# Patient Record
Sex: FEMALE | Race: WHITE | NOT HISPANIC OR LATINO | Employment: FULL TIME | URBAN - METROPOLITAN AREA
[De-identification: names, ages, dates, MRNs, and addresses within clinical notes are randomized per-mention and may not be internally consistent; named-entity substitution may affect disease eponyms.]

---

## 2020-04-14 PROBLEM — F41.9 ANXIETY: Status: ACTIVE | Noted: 2020-04-14

## 2020-04-14 RX ORDER — BUPROPION HYDROCHLORIDE 150 MG/1
150 TABLET ORAL DAILY
COMMUNITY
End: 2020-04-21 | Stop reason: SINTOL

## 2020-04-14 RX ORDER — ESCITALOPRAM OXALATE 10 MG/1
10 TABLET ORAL DAILY
COMMUNITY
End: 2020-04-14 | Stop reason: SINTOL

## 2020-04-14 RX ORDER — HYDROXYZINE PAMOATE 50 MG/1
50 CAPSULE ORAL 4 TIMES DAILY
COMMUNITY
End: 2020-12-23

## 2020-04-21 ENCOUNTER — TELEPHONE (OUTPATIENT)
Dept: OBSTETRICS AND GYNECOLOGY | Facility: CLINIC | Age: 25
End: 2020-04-21

## 2020-04-21 DIAGNOSIS — F41.9 ANXIETY: Primary | ICD-10-CM

## 2020-04-21 DIAGNOSIS — Z30.9 ENCOUNTER FOR CONTRACEPTIVE MANAGEMENT, UNSPECIFIED TYPE: ICD-10-CM

## 2020-04-21 RX ORDER — BUSPIRONE HYDROCHLORIDE 7.5 MG/1
7.5 TABLET ORAL 3 TIMES DAILY
Qty: 90 TABLET | Refills: 2 | Status: SHIPPED | OUTPATIENT
Start: 2020-04-21 | End: 2020-08-17 | Stop reason: SDUPTHER

## 2020-04-21 RX ORDER — LEVONORGESTREL AND ETHINYL ESTRADIOL 0.1-0.02MG
1 KIT ORAL DAILY
COMMUNITY
End: 2020-04-21 | Stop reason: SDUPTHER

## 2020-04-21 RX ORDER — LEVONORGESTREL AND ETHINYL ESTRADIOL 0.1-0.02MG
1 KIT ORAL DAILY
Qty: 28 TABLET | Refills: 1 | Status: SHIPPED | OUTPATIENT
Start: 2020-04-21 | End: 2020-08-17 | Stop reason: SDUPTHER

## 2020-04-21 NOTE — TELEPHONE ENCOUNTER
Subjective:      Ana Padilla is a 25 y.o. female who presents for follow up of anxiety disorder. Current symptoms: irritable. She denies current suicidal and homicidal ideation. She complains of the following side effects from the treatment: mood swings, mostly inability to control feelings of irritability that come on suddenly.  Objective:   Unable to perform. Patient evaluated via telephone.     Assessment:   Anxiety Disorder - improving but intolerance of medication therapy     Plan:   Treatment per orders.  Reviewed concept of anxiety as biochemical imbalance of neurotransmitters and rationale for treatment.   Discussed weaning off of Wellbutrin and beginning BuSpar. Patient agrees with plan. Patient verbalizes understanding and denies additional questions at this time.   Patient education provided regarding non-pharmacological methods for dealing with symptoms of anxiety such as, relaxation techniques, deep breathing exercises, exercise, etc. Discussion of pharmacological management was carried out at length, including the following topics:   Medication regimen, side effects, mechanism of action   When first starting medication or making dose adjustments, it may take 2-4 weeks to begin feeling the effects of medication   Avoid abrupt withdrawal from treatment. If you wish to discontinue medication therapy, please contact the office before doing so to discuss how to safely get off the medication.   Dangers associated with sudden discontinuation, including serotonin syndrome   Instructed to report immediately to the nearest ER with any thoughts of suicidal or homicidal ideation. Please notify provider immediately.    Patient made aware of resources available for dealing with suicidal thoughts, including the national suicide hotline number = 1-977-970-TALK (7620) and the website = www.suicidepreventionlifeline.org    Contact provider immediately if symptoms worsen or new symptoms appear.   Report to the  nearest emergency room if symptoms of serotonin syndrome are experienced such as sudden onset of high fever, muscular rigidity, mental status changes, hyperreflexia/clonus, and uncontrolled shivering

## 2020-04-29 ENCOUNTER — TELEPHONE (OUTPATIENT)
Dept: OBSTETRICS AND GYNECOLOGY | Facility: CLINIC | Age: 25
End: 2020-04-29

## 2020-04-29 NOTE — TELEPHONE ENCOUNTER
Patient states she is feeling OK and feels as if the new anxiety medication is working. Advised her to call with any concerns or side effect symptoms immediately to inform us. States understanding.

## 2020-08-16 NOTE — PROGRESS NOTES
"  Subjective:      Patient ID: Ana Padilla is a 25 y.o. female who presents for evaluation today.    Chief Complaint:    Well Woman      History of Present Illness  HPI  Annual Exam-Premenopausal  Patient presents for annual exam. The patient has no complaints today. The patient is sexually active. GYN screening history: last pap: approximate date 06/19/2017 and was normal. The patient wears seatbelts: yes. The patient participates in regular exercise: yes. Has the patient ever been transfused or tattooed?: no. The patient reports that there is not domestic violence in her life.    Contraception Counseling  Patient presents for contraception counseling. The patient has no complaints today. The patient is sexually active. Pertinent past medical history: none.      GYN History  Patient's last menstrual period was 08/10/2020.   Date of Last Pap: Pap smear completed today with screening for gonorrhea, chlamydia and trichomoniasis per CDC guidelines Pap smear schedule reviewed with patient Result history reviewed with patient    VITALS  BP Readings from Last 1 Encounters:   08/17/20 102/70   Weight: 52.6 kg (116 lb)   Height: 5' 4" (162.6 cm)   BMI Readings from Last 1 Encounters:   08/17/20 19.91 kg/m²       FAMILY HISTORY  Family History   Problem Relation Age of Onset    Breast cancer Maternal Grandmother        SOCIAL HISTORY  Social History     Tobacco Use   Smoking Status Never Smoker   ,   Social History     Substance and Sexual Activity   Alcohol Use Yes    Comment: occasional        MEDICATIONS  Outpatient Medications Marked as Taking for the 8/17/20 encounter (Office Visit) with Meenu Sandra NP   Medication Sig Dispense Refill    busPIRone (BUSPAR) 7.5 MG tablet Take 1 tablet (7.5 mg total) by mouth 3 (three) times daily. Take one 7.5 mg tablet by mouth twice daily for two weeks, then increase frequency to three times daily if symptoms are not relieved. 90 tablet 3    [DISCONTINUED] busPIRone (BUSPAR) " 7.5 MG tablet Take 1 tablet (7.5 mg total) by mouth 3 (three) times daily. Take one 7.5 mg tablet by mouth twice daily for two weeks, then increase frequency to three times daily if symptoms are not relieved. 90 tablet 2     Review of Systems  Review of Systems   Constitutional: Negative for activity change, appetite change, chills, diaphoresis, fatigue, fever and unexpected weight change.   HENT: Negative for mouth sores.    Eyes: Negative for visual disturbance.   Respiratory: Negative for cough and shortness of breath.    Cardiovascular: Negative for chest pain, palpitations and leg swelling.   Gastrointestinal: Negative for abdominal pain, bloating, blood in stool, constipation, diarrhea and nausea.   Endocrine: Negative for hair loss and hot flashes.   Genitourinary: Negative for dysmenorrhea, dysuria, menstrual problem, vaginal discharge, vaginal pain, urinary incontinence, vaginal dryness and vaginal odor.   Musculoskeletal: Negative for back pain and leg pain.   Integumentary:  Negative for rash, acne, hair changes, mole/lesion, breast mass, nipple discharge and breast skin changes.   Neurological: Negative for headaches.   Hematological: Negative for adenopathy. Does not bruise/bleed easily.   Psychiatric/Behavioral: Negative for depression and sleep disturbance. The patient is nervous/anxious (improving).    Breast: Positive for breast self exam.Negative for asymmetry, mass, mastodynia, nipple discharge and skin changes       Objective:     Physical Exam:   Constitutional: She is oriented to person, place, and time. She appears well-developed and well-nourished. She is cooperative. She does not appear ill. No distress.    HENT:   Head: Normocephalic and atraumatic.   Right Ear: External ear normal.   Left Ear: External ear normal.     Neck: Trachea normal and normal range of motion. Neck supple. No thyroid mass and no thyromegaly present.    Cardiovascular: Normal rate, regular rhythm and normal pulses.      Pulmonary/Chest: Effort normal and breath sounds normal. No stridor. No respiratory distress. Chest wall is not dull to percussion. She exhibits no mass, no bony tenderness, no laceration, no crepitus, no edema, no deformity, no swelling and no retraction. Right breast exhibits no inverted nipple, no mass, no nipple discharge, no skin change, no tenderness, presence, no bleeding and no swelling. Left breast exhibits no inverted nipple, no mass, no nipple discharge, no skin change, no tenderness, presence, no bleeding and no swelling. Breasts are symmetrical.        Abdominal: Soft. Normal appearance and bowel sounds are normal. She exhibits no distension. There is no abdominal tenderness. No hernia.     Genitourinary:    Vagina, uterus and rectum normal.      Pelvic exam was performed with patient supine.   There is no rash, tenderness, lesion or injury on the right labia. There is no rash, tenderness, lesion or injury on the left labia. Cervix is normal. Right adnexum displays no mass, no tenderness and no fullness. Left adnexum displays no mass, no tenderness and no fullness. No tenderness or bleeding in the vagina.    No foreign body in the vagina.   Labial bartholins normal.Additional cervical findings: pap smear donenegative for vaginal discharge          Musculoskeletal: Normal range of motion and moves all extremeties.       Neurological: She is alert and oriented to person, place, and time.    Skin: Skin is warm and dry. No rash noted. She is not diaphoretic. No erythema. No pallor.    Psychiatric: She has a normal mood and affect. Her speech is normal and behavior is normal. Judgment and thought content normal.          Assessment:      1. Well woman exam with routine gynecological exam    2. Encounter for Papanicolaou smear for cervical cancer screening    3. Encounter for surveillance of contraceptive pills    4. Encounter for contraceptive management, unspecified type    5. Anxiety      Plan:   Pap  smear obtained today, will follow up with patient when results are received. The patient verbalizes desire to continue with current contraceptive regimen. The risks associated with oral contraceptive use, with special emphasis placed on the risk for stroke, blood clots, MI and death were reviewed with the patient. The patient was provided with the opportunity to ask questions and verbalize concerns. Patient education was provided on the following aspects associated with oral contraceptive use:   Take the pill at the same time every day, set an alarm to serve as a reminder if necessary    If nausea or vomiting occurs when taking the medication, take with food or at bedtime   Missing doses may lead to breakthrough bleeding    Your periods may become lighter and/or shorter in duration than usual   If a pill is missed for one day, take 2 pills the next day   If two consecutive days are missed, take 2 pills for the next 2 days to catch up and finish the birth control pack. Utilize a backup method of contraception for the remainder of the pill pack.   Certain medications, especially antibiotics, may decrease the effectiveness of the pill   Oral contraceptives do not provide protection against sexually transmitted diseases  If any of the following symptoms are experiencing report to the nearest emergency room for further evaluation and discontinue the pill immediately.   Severe abdominal pain   Severe chest pain   Severe headache or paresthesia    Changes in vision   Severe leg pain or swelling   Shortness of breath and increased heart rate   Discussed by taking the pill, the patient consents to knowing and understanding the risks associated with oral contraceptive therapy. The patient verbalizes understanding of the components discussed and denies additional questions at this time.  Patient reports control of anxiety symptoms on current medication regimen. Patient education provided regarding  non-pharmacological methods for dealing with symptoms of anxiety such as, relaxation techniques, deep breathing exercises, exercise, etc. Discussion of pharmacological management was carried out at length, including the following topics:   Medication regimen, side effects, mechanism of action   When first starting medication or making dose adjustments, it may take 2-4 weeks to begin feeling the effects of medication   Avoid abrupt withdrawal from treatment. If you wish to discontinue medication therapy, please contact the office before doing so to discuss how to safely get off the medication.   Dangers associated with sudden discontinuation, including serotonin syndrome   Instructed to report immediately to the nearest ER with any thoughts of suicidal or homicidal ideation. Please notify provider immediately.    Patient made aware of resources available for dealing with suicidal thoughts, including the national suicide hotline number = 3-040-536-TALK (0830) and the website = www.suicidepreventionlifeline.org    Contact provider immediately if symptoms worsen or new symptoms appear.   Report to the nearest emergency room if symptoms of serotonin syndrome are experienced such as sudden onset of high fever, muscular rigidity, mental status changes, hyperreflexia/clonus, and uncontrolled shivering    Patient was counseled today on current [facility]P pap smear guidelines, the recommendation for yearly pelvic and clinical breast exams, healthy diet consumption, implementing exercise routines 4-5x/week, when to begin mammogram screenings, and breast self awareness. Patient instructed to contact the clinic should any questions or concerns arise prior to her next office visit. Patient is happy with the plan of care at this time, verbalizes understanding and denies outstanding questions.

## 2020-08-17 ENCOUNTER — OFFICE VISIT (OUTPATIENT)
Dept: OBSTETRICS AND GYNECOLOGY | Facility: CLINIC | Age: 25
End: 2020-08-17
Payer: COMMERCIAL

## 2020-08-17 VITALS
HEIGHT: 64 IN | DIASTOLIC BLOOD PRESSURE: 70 MMHG | SYSTOLIC BLOOD PRESSURE: 102 MMHG | WEIGHT: 116 LBS | BODY MASS INDEX: 19.81 KG/M2

## 2020-08-17 DIAGNOSIS — F41.9 ANXIETY: ICD-10-CM

## 2020-08-17 DIAGNOSIS — Z30.9 ENCOUNTER FOR CONTRACEPTIVE MANAGEMENT, UNSPECIFIED TYPE: ICD-10-CM

## 2020-08-17 DIAGNOSIS — Z01.419 WELL WOMAN EXAM WITH ROUTINE GYNECOLOGICAL EXAM: Primary | ICD-10-CM

## 2020-08-17 DIAGNOSIS — Z12.4 ENCOUNTER FOR PAPANICOLAOU SMEAR FOR CERVICAL CANCER SCREENING: ICD-10-CM

## 2020-08-17 PROCEDURE — 99395 PR PREVENTIVE VISIT,EST,18-39: ICD-10-PCS | Mod: S$GLB,,, | Performed by: OBSTETRICS & GYNECOLOGY

## 2020-08-17 PROCEDURE — 99395 PREV VISIT EST AGE 18-39: CPT | Mod: S$GLB,,, | Performed by: OBSTETRICS & GYNECOLOGY

## 2020-08-17 RX ORDER — LEVONORGESTREL AND ETHINYL ESTRADIOL 0.1-0.02MG
1 KIT ORAL DAILY
Qty: 90 TABLET | Refills: 3 | Status: SHIPPED | OUTPATIENT
Start: 2020-08-17 | End: 2021-04-20 | Stop reason: SDUPTHER

## 2020-08-17 RX ORDER — BUSPIRONE HYDROCHLORIDE 7.5 MG/1
7.5 TABLET ORAL 3 TIMES DAILY
Qty: 90 TABLET | Refills: 3 | Status: SHIPPED | OUTPATIENT
Start: 2020-08-17 | End: 2020-12-23 | Stop reason: SDUPTHER

## 2020-08-17 NOTE — PATIENT INSTRUCTIONS
Breast Health: Breast Self-Awareness  What is breast self-awareness?  Breast self-awareness is knowing how your breasts normally look and feel. Your breasts change as you go through different stages of your life. So its important to learn what is normal for your breasts. Breast self-awareness helps you notice any changes in your breasts right away. Report any changes to your healthcare provider.  Why is breast self-awareness important?  Many experts now say that women should focus on breast self-awareness instead of doing a breast self-examination (BSE). These experts include the American Cancer Society, the U.S. Preventive Services Task Force, and the American Congress of Obstetricians and Gynecologists. Some experts even advise not teaching women to do a BSE. Thats because research hasnt shown a clear benefit to doing BSEs.  Breast self-awareness is different than a BSE. Breast self-awareness isnt about following a certain method and schedule. Its about knowing what's normal for your breasts. That way you can notice even small changes right away. If you see any changes, report them to your healthcare provider.  Changes to look for  Call your healthcare provider if you find any changes in your breasts that concern you. These changes may include:  · A lump  · Nipple discharge other than breast milk, especially a bloody discharge  · Swelling  · A change in size or shape  · Skin irritation, such as redness, thickening, or dimpling of the skin  · Swollen lymph nodes in the armpit  · Nipple problems, such as pain or redness  If you find a lump  Contact your provider if you find lumpiness in one breast, feel something different in the tissue, or feel a definite lump. Sometimes lumpiness may be due to menstrual changes. But there may be reason for concern.  Your provider may want to see you right away if you have:  · Nipple discharge that is bloody  · Skin changes on your breast, such as dimpling or puckering  Its  normal to be upset if you find a lump. But its important to contact your provider right away. Remember that most breast lumps are benign. This means they are not cancer.  Date Last Reviewed: 8/10/2015  © 2078-0967 [employer]. [address] [place] [id number]. All rights reserved. This information is not intended as a substitute for professional medical care. Always follow your healthcare professional's instructions.          Birth Control Choices  Birth control keeps you from getting pregnant during sex. There are many types of birth control. Some are more effective than others. New types are being tested all the time. Your healthcare provider can help you decide which type of birth control is best for you. But no matter which type you choose, you and your partner must use it the right way each time you have sex. Some of the most common types are described below.  Condom  A condom is a thin covering that fits over the penis. (The female condom fits inside the vagina.) A condom catches sperm that come out of the penis during sex.  Spermicide  Spermicide is a gel, foam, cream, tablet, or sponge (although the sponge has barrier properties in addition to spermicidal properties). It is put in the vagina before sex to kill sperm.  Diaphragm and cervical cap  Diaphragms and cervical caps are round rubber cups that keep sperm out of the uterus. They also hold spermicide in place.  Intrauterine device (IUD)  An IUD is a small device that is placed in the uterus by a healthcare provider to prevent pregnancy.  The pill  The birth control pill is taken daily. It contains hormones that stop a womans body from releasing an egg each month.  Other hormones  Hormones that stop a womans egg from being released each month can be delivered in other ways. These include injection, implant, patch, or vaginal ring.  Other choices  Here are additional birth control methods:  · Male sterilization (vasectomy) is  surgery that ties off or cuts the tubes called the vas deferens in the testes. This is done so sperm cannot come out when the man ejaculates.  · Female sterilization is surgery to block or cut the woman's fallopian tubes. It can be done by placing an instrument into the uterus (hysteroscopy) to insert small coils into the fallopian tubes (Essure). It can also be done through the belly (laparoscopy) to block the tubes or remove part or all of the tubes.  · Withdrawal method is when the male doesn't ejaculate into the vagina, but rather withdraws his penis just before he ejaculates.  · Fertility awareness method is when a woman keeps track of her fertile days. She only has intercourse at times when she is not likely to get pregnant.  Emergency contraception (EC)  Emergency contraception can help prevent pregnancy after unprotected sex. Hormone pills (morning after pills) are available over the counter to anyone. A second type of EC, a copper IUD, needs to be inserted by a trained healthcare provider. Either type of EC can be used up to 5 days after sex, but it should be taken as soon as possible. The sooner it is used after unprotected sex, the more likely it is to be effective. EC will not work if youre already pregnant.  Things to consider  Think about the following:  · Choose a type of birth control that is easy for you to use.  · Read the package and follow your health care provider's instructions to learn to use your birth control the right way.  · Most forms of birth control do not protect you from sexually transmitted infections (STIs). To protect against STIs, always use a latex condom. If you are allergic to latex, a nonlatex condom may provide some protection.   Date Last Reviewed: 12/1/2016  © 7590-9376 [employer]. [address] [place] [address], [place] [address]. All rights reserved. This information is not intended as a substitute for professional medical care. Always follow your healthcare  professional's instructions.          Birth Control: The Pill    Birth control pills contain hormones that help prevent pregnancy. The pills are prescribed by your healthcare provider. There are many types of birth control pills available. If you have side effects from one type of pill, tell your healthcare provider. He or she may be able to prescribe a pill that works better for you.  Pregnancy rates  Talk to your healthcare provider about the effectiveness of this birth control method.  Using the pill  · Take one pill daily. Take it at around the same time each day.  · Follow your healthcare providers guidelines on when to start your first pack of pills. You may need to use another form of birth control for a week or more after you start.  · Know what to do if you forget to take a pill. (Consult your healthcare provider or check the package.) If you miss more than one pill, you may need to use a backup method of birth control for a week or more.  Pros  · Low pregnancy rate  · No interruption to sex  · Easy to use  · Can help make periods more regular  · May lower your risk of ovarian cysts and certain cancers  · May decrease menstrual cramps, menstrual flow, and acne  Cons  · Does not protect against sexually transmitted infection (STIs)  · Requires taking a pill on time each day  · May not work as well when taken with certain other medicines (check with your pharmacist)  · May cause side effects such as nausea, irregular bleeding, headaches, breast tenderness, fatigue, or mood changes (these often go away within 3 months)  · May increase the risk of blood clots, heart attack, and stroke  The pill may not be for you  The pill may not be for you if:  · You are a smoker and over age 35  · You have high blood pressure or gallbladder, liver, cerebrovascular  or heart disease  · You have diabetes, migraines, blood clot in the vein or artery, lupus, depression, certain lipid disorders, or take medicines that interfere  with the pill  In these cases, discuss the risks with your healthcare provider.  Date Last Reviewed: 3/1/2017  © 2773-7716 [employer]. [address] [place] [address], [place] [address]. All rights reserved. This information is not intended as a substitute for professional medical care. Always follow your healthcare professional's instructions.          The Range of Pap Test Results  When your Pap test is sent to the lab, the lab studies your cell samples and reports any abnormal cell changes. Your health care provider can discuss these changes with you. In some cases, an abnormal Pap test is due to an infection. More serious cell changes range from dysplasia to cancer. Talk to your health care provider about your Pap test.    Normal results  Cervical cells, even normal ones, are always changing. As they mature, normal squamous cells move from deeper layers within the cervix. Over time, these cells flatten and cover the surface of the cervix. Within the cervical canal, the cells are different. These glandular cells are taller and not as flat as the cells on the surface of the cervix. When a Pap test sample shows healthy cells of both types, the results are negative. Keep having Pap tests as often as directed.  Abnormal results  A positive Pap test result means some cells in the sample showed abnormal changes. These results are grouped by the type of cell change and the location, or extent, of the changes. Depending on the results, you may need further testing.  · Inflammation: Noncancerous changes are present. They may be due to normal cell repair. Or, they may be caused by an infection, such as HPV or yeast. Further testing may be needed. (Also called reactive cellular changes.)  · Atypical squamous cells: Test results are unclear. Cells on the surface of the cervix show changes, but their significance is not yet known. Testing for HPV and other sexually transmitted infections (STIs) may be needed. Treatment  may be required. (Reported as ASC-US or ASC-H.)  · Atypical glandular cells: Cells lining the cervical canal show abnormal changes. Further testing is likely. You may also have treatment to destroy or remove problem cells. (Reported as AGC.)  · Mild dysplasia: Cells show distinct changes. More testing or HPV typing may be done. You may also have treatment to destroy or remove problem cells. (Reported as low-grade SUNNY or CALEB 1.)  · Moderate to severe dysplasia: Cells show precancerous changes. Or, noninvasive cancer (carcinoma in situ) may be present. Treatment to destroy or remove problem cells is likely. (Reported as high-grade SUNNY or CALEB 2 or CALEB 3.)  · Cancer: Different types of cancer may be detected by your Pap test. More tests to assess the cancer's extent are likely. The type of treatment will depend on the test results and other factors, such as age and health history. (Reported as squamous cell carcinoma, endocervical adenocarcinoma in situ, or adenocarcinoma.)  Date Last Reviewed: 5/12/2015 © 2000-2017 [employer]. [address] [place] [address]. All rights reserved. This information is not intended as a substitute for professional medical care. Always follow your healthcare professional's instructions.          When You Have an Abnormal Pap Test    The Pap test is a screening test that checks for cell changes in the cervix, the opening of the uterus. In some cases, it checks for a virus that can cause cervical cancer. If your Pap results were abnormal, you may be worried. But there is no reason to panic. An abnormal Pap test result can mean many things. It may be due to changes (inflammation) caused by normal cell repair or infection. Or you may have a problem called dysplasia that could become cervical cancer. If so, know that dysplasia tends to progress very slowly before becoming cervical cancer. Thats why its so important to have Pap tests as often as directed. Pap tests  can show cell changes in the cervix early, when treatment is most effective.  Talk to your health care provider  Be sure to discuss your results with your health care provider. Find out about any follow-up tests youll need. You may be asked to come back for a second Pap test in a few months. Or, you may be scheduled for an exam so your health care provider can get a closer look at your cervix. In either case, be sure to keep your follow-up visits. They are one of your best safeguards against future problems.  Understanding your risk  Some lifestyle choices can increase your risk of abnormal cell changes. Did you start having sex at a young age? Have you had many sexual partners? Have you had sex without using a latex condom? Do you smoke? If you answered yes to any of these questions, you are more at risk. One of the most common reasons for an abnormal Pap result is infection with the human papillomavirus (HPV). If your Pap results suggest HPV, further testing may be needed.     The Pap test  During the test:  · An instrument called a speculum is inserted into the vagina to hold it open. This lets your health care provider see the cervix.  · A small brush or swab is used to take cells from several areas of the cervix. The cells are put into a liquid or on a slide. They are then sent to a lab where they are studied for changes. Your health care provider will contact you with the results.   Date Last Reviewed: 4/26/2015  © 3433-6900 [employer]. [address] [place] [address]. All rights reserved. This information is not intended as a substitute for professional medical care. Always follow your healthcare professional's instructions.          Anxiety Reaction  Anxiety is the feeling we all get when we think something bad might happen. It is a normal response to stress and usually causes only a mild reaction. When anxiety becomes more severe, it can interfere with daily life. In some cases,  you may not even be aware of what it is youre anxious about. There may also be a genetic link or it may be a learned behavior in the home.  Both psychological and physical triggers cause stress reaction. It's often a response to fear or emotional stress, real or imagined. This stress may come from home, family, work, or social relationships.  During an anxiety reaction, you may feel:  · Helpless  · Nervous  · Depressed  · Irritable  Your body may show signs of anxiety in many ways. You may experience:  · Dry mouth  · Shakiness  · Dizziness  · Weakness  · Trouble breathing  · Breathing fast (hyperventilating)  · Chest pressure  · Sweating  · Headache  · Nausea  · Diarrhea  · Tiredness  · Inability to sleep  · Sexual problems  Home care  · Try to locate the sources of stress in your life. They may not be obvious. These may include:  ¨ Daily hassles of life (traffic jams, missed appointments, car troubles, etc.)  ¨ Major life changes, both good (new baby, job promotion) and bad (loss of job, loss of loved one)  ¨ Overload: feeling that you have too many responsibilities and can't take care of all of them at once  ¨ Feeling helpless, feeling that your problems are beyond what youre able to solve  · Notice how your body reacts to stress. Learn to listen to your body signals. This will help you take action before the stress becomes severe.  · When you can, do something about the source of your stress. (Avoid hassles, limit the amount of change that happens in your life at one time and take a break when you feel overloaded).  · Unfortunately, many stressful situations can't be avoided. It is necessary to learn how to better manage stress. There are many proven methods that will reduce your anxiety. These include simple things like exercise, good nutrition and adequate rest. Also, there are certain techniques that are helpful:  ¨ Relaxation  ¨ Breathing exercises  ¨ Visualization  ¨ Biofeedback  ¨ Meditation  For more  information about this, consult your doctor or go to a local bookstore and review the many books and tapes available on this subject.  Follow-up care  If you feel that your anxiety is not responding to self-help measures, contact your doctor or make an appointment with a counselor. You may need short-term psychological counseling and temporary medicine to help you manage stress.  Call 911  Call your healthcare provider right away if any of these occur:  · Trouble breathing  · Confusion  · Drowsiness or trouble wakening  · Fainting or loss of consciousness  · Rapid heart rate  · Seizure  · New chest pain that becomes more severe, lasts longer, or spreads into your shoulder, arm, neck, jaw, or back  When to seek medical advice  Call your healthcare provider right away if any of these occur:  · Your symptoms get worse  · Severe headache not relieved by rest and mild pain reliever  Date Last Reviewed: 9/29/2015  © 1105-9564 [employer]. [address] [place] [address]. All rights reserved. This information is not intended as a substitute for professional medical care. Always follow your healthcare professional's instructions.          Treating Anxiety Disorders with Medicine  An anxiety disorder can make you feel nervous or apprehensive, even without a clear reason. In people age 65 and older, generalized anxiety disorder is one of the most commonly diagnosed anxiety disorders. Many times it occurs with depression. Certain anxiety disorders can cause intense feelings of fear or panic. You may even have physical symptoms such as a racing heartbeat, sweating, or dizziness. If you have these feelings, you dont have to suffer anymore. Treatment to help you overcome your fears will likely include therapy (also called counseling). Medicine may also be prescribed to help control your symptoms.    Medicines  Certain medicines may be prescribed to help control your symptoms. So you may feel less anxious.  You may also feel able to move forward with therapy. At first, medicines and dosages may need to be adjusted to find what works best for you. Try to be patient. Tell your healthcare provider how a medicine makes you feel. This way, you can work together to find the treatment thats best for you. Keep in mind that medicines can have side effects. Talk with your provider about any side effects that are bothering you. Changing the dose or type of medicine may help. Dont stop taking medicine on your own. That can cause symptoms to come back.  · Anti-anxiety medicine. This medicine eases symptoms and helps you relax. Your healthcare provider will explain when and how to use it. It may be prescribed for use before situations that make you anxious. You may also be told to take medicine on a regular schedule. Anti-anxiety medicine may make you feel a little sleepy or out of it. Dont drive a car or operate machinery while on this medicine, until you know how it affects you.  Caution  Never use alcohol or other drugs with anti-anxiety medicines. This could result in loss of muscular control, sedation, coma, or death. Also, use only the amount of medicine prescribed for you. If you think you may have taken too much, get emergency care right away.   · Antidepressant medicine. This kind of medicine is often used to treat anxiety, even if you arent depressed. An antidepressant helps balance out brain chemicals. This helps keep anxiety under control. This medicine is taken on a schedule. It takes a few weeks to start working. If you dont notice a change at first, you may just need more time. But if you dont notice results after the first few weeks, tell your provider.  Keep taking medicines as prescribed  Never change your dosage, share or use another person's medicine, or stop taking your medicines without talking to your healthcare provider first. Keep the following in mind:  · Some medicines must be taken on a schedule.  Make this part of your daily routine. For instance, always take your pill before brushing your teeth. A pillbox can help you remember if youve taken your medicine each day.  · Medicines are often taken for 6 to 12 months. Your healthcare provider will then evaluate whether you need to stay on them. Many people who have also had therapy may no longer need medicine to manage anxiety.  · You may need to stop taking medicine slowly to give your body time to adjust. When its time to stop, your healthcare provider will tell you more. Remember: Never stop taking your medicine without talking to your provider first.  · If symptoms return, you may need to start taking medicines again. This isnt your fault. Its just the nature of your anxiety disorder.  Special concerns  · Side effects. Medicines may cause side effects. Ask your healthcare provider or pharmacist what you can expect. They may have ideas for avoiding some side effects.  · Sexual problems. Some antidepressants can affect your desire for sex or your ability to have an orgasm. A change in dosage or medicine often solves the problem. If you have a sexual side effect that concerns you, tell your healthcare provider.  · Addiction. If youve never had a problem with drugs or alcohol, you may not have a problem with medicines used to treat anxiety disorders. But always discuss the medicines with your healthcare provider before taking them. If you have a history of addiction, you may not be able to use certain medicines used to treat anxiety disorders.  · Medicine interactions. Always check with your pharmacist before using any over-the-counter medicines, including herbal supplements.   Date Last Reviewed: 5/1/2017  © 8150-5967 [employer]. [address] [place] [address], [place] [address]. All rights reserved. This information is not intended as a substitute for professional medical care. Always follow your healthcare professional's instructions.

## 2020-08-17 NOTE — PROGRESS NOTES
Yes seatbelt, yes exercise, no transfusion, no tattoo, no domestic    Here for annual today. No issues or concerns today. Need refill on OCP.

## 2020-08-19 LAB
CHLAMYDIA: NEGATIVE
GONORRHEA: NEGATIVE
SOURCE: NORMAL
SOURCE: NORMAL
TRICHOMONAS AMPLIFIED: NEGATIVE

## 2020-12-23 ENCOUNTER — TELEPHONE (OUTPATIENT)
Dept: OBSTETRICS AND GYNECOLOGY | Facility: CLINIC | Age: 25
End: 2020-12-23

## 2020-12-23 DIAGNOSIS — F41.9 ANXIETY: Primary | ICD-10-CM

## 2020-12-23 RX ORDER — CLORAZEPATE DIPOTASSIUM 7.5 MG/1
7.5 TABLET ORAL EVERY 8 HOURS PRN
Qty: 30 TABLET | Refills: 0 | Status: SHIPPED | OUTPATIENT
Start: 2020-12-23 | End: 2020-12-23

## 2020-12-23 RX ORDER — BUSPIRONE HYDROCHLORIDE 15 MG/1
15 TABLET ORAL 3 TIMES DAILY
Qty: 90 TABLET | Refills: 2 | Status: SHIPPED | OUTPATIENT
Start: 2020-12-23 | End: 2020-12-23

## 2020-12-23 RX ORDER — BUSPIRONE HYDROCHLORIDE 15 MG/1
15 TABLET ORAL 3 TIMES DAILY
Qty: 90 TABLET | Refills: 2 | Status: SHIPPED | OUTPATIENT
Start: 2020-12-23 | End: 2021-06-11 | Stop reason: SDUPTHER

## 2020-12-23 RX ORDER — CLORAZEPATE DIPOTASSIUM 7.5 MG/1
7.5 TABLET ORAL EVERY 8 HOURS PRN
Qty: 30 TABLET | Refills: 0 | Status: SHIPPED | OUTPATIENT
Start: 2020-12-23 | End: 2022-08-19 | Stop reason: SDUPTHER

## 2020-12-23 NOTE — TELEPHONE ENCOUNTER
Patient reports increase in anxiety symptoms, no longer controlled by her usual medication regimen. She states the medication has been working up until about 1 week ago when she noticed the increase in symptoms. Discussed increasing the dose of her usual medication regimen and adding an as needed medication. Discussed safety considerations with use of the as needed treatment. Patient verbalizes understanding and denies additional questions at this time. F/u in 4 weeks     No evidence of abuse/diversion/addiction suspected.  checked prior to prescribing with clear report. Patient is aware of side effects to report, risks associated with medication usage, etc.

## 2021-01-14 ENCOUNTER — TELEPHONE (OUTPATIENT)
Dept: OBSTETRICS AND GYNECOLOGY | Facility: CLINIC | Age: 26
End: 2021-01-14

## 2021-04-20 ENCOUNTER — TELEPHONE (OUTPATIENT)
Dept: OBSTETRICS AND GYNECOLOGY | Facility: CLINIC | Age: 26
End: 2021-04-20

## 2021-04-20 DIAGNOSIS — Z30.9 ENCOUNTER FOR CONTRACEPTIVE MANAGEMENT, UNSPECIFIED TYPE: ICD-10-CM

## 2021-04-20 DIAGNOSIS — Z30.41 ENCOUNTER FOR SURVEILLANCE OF CONTRACEPTIVE PILLS: Primary | ICD-10-CM

## 2021-04-20 RX ORDER — LEVONORGESTREL AND ETHINYL ESTRADIOL 0.1-0.02MG
1 KIT ORAL DAILY
Qty: 90 TABLET | Refills: 1 | Status: SHIPPED | OUTPATIENT
Start: 2021-04-20 | End: 2021-08-18 | Stop reason: SDUPTHER

## 2021-04-20 RX ORDER — LEVONORGESTREL AND ETHINYL ESTRADIOL 0.1-0.02MG
1 KIT ORAL DAILY
Qty: 90 TABLET | Refills: 1 | Status: SHIPPED | OUTPATIENT
Start: 2021-04-20 | End: 2021-04-20

## 2021-06-11 ENCOUNTER — TELEPHONE (OUTPATIENT)
Dept: FAMILY MEDICINE | Facility: CLINIC | Age: 26
End: 2021-06-11

## 2021-06-11 DIAGNOSIS — F41.9 ANXIETY: Primary | ICD-10-CM

## 2021-06-11 RX ORDER — BUSPIRONE HYDROCHLORIDE 15 MG/1
15 TABLET ORAL 3 TIMES DAILY
Qty: 90 TABLET | Refills: 0 | Status: SHIPPED | OUTPATIENT
Start: 2021-06-11 | End: 2021-06-11

## 2021-06-11 RX ORDER — BUSPIRONE HYDROCHLORIDE 15 MG/1
15 TABLET ORAL 3 TIMES DAILY
Qty: 90 TABLET | Refills: 3 | Status: SHIPPED | OUTPATIENT
Start: 2021-06-11 | End: 2021-08-18 | Stop reason: SDUPTHER

## 2021-08-18 ENCOUNTER — OFFICE VISIT (OUTPATIENT)
Dept: FAMILY MEDICINE | Facility: CLINIC | Age: 26
End: 2021-08-18
Payer: COMMERCIAL

## 2021-08-18 VITALS
SYSTOLIC BLOOD PRESSURE: 102 MMHG | DIASTOLIC BLOOD PRESSURE: 69 MMHG | BODY MASS INDEX: 20.35 KG/M2 | HEIGHT: 64 IN | RESPIRATION RATE: 14 BRPM | OXYGEN SATURATION: 98 % | HEART RATE: 81 BPM | TEMPERATURE: 97 F | WEIGHT: 119.19 LBS

## 2021-08-18 DIAGNOSIS — Z12.4 ENCOUNTER FOR PAPANICOLAOU SMEAR FOR CERVICAL CANCER SCREENING: ICD-10-CM

## 2021-08-18 DIAGNOSIS — Z01.419 WELL WOMAN EXAM WITH ROUTINE GYNECOLOGICAL EXAM: Primary | ICD-10-CM

## 2021-08-18 DIAGNOSIS — Z30.9 ENCOUNTER FOR CONTRACEPTIVE MANAGEMENT, UNSPECIFIED TYPE: ICD-10-CM

## 2021-08-18 DIAGNOSIS — F41.9 ANXIETY: ICD-10-CM

## 2021-08-18 PROCEDURE — 99395 PR PREVENTIVE VISIT,EST,18-39: ICD-10-PCS | Mod: S$GLB,,, | Performed by: OBSTETRICS & GYNECOLOGY

## 2021-08-18 PROCEDURE — 3074F SYST BP LT 130 MM HG: CPT | Mod: CPTII,S$GLB,, | Performed by: OBSTETRICS & GYNECOLOGY

## 2021-08-18 PROCEDURE — 99395 PREV VISIT EST AGE 18-39: CPT | Mod: S$GLB,,, | Performed by: OBSTETRICS & GYNECOLOGY

## 2021-08-18 PROCEDURE — 1159F PR MEDICATION LIST DOCUMENTED IN MEDICAL RECORD: ICD-10-PCS | Mod: CPTII,S$GLB,, | Performed by: OBSTETRICS & GYNECOLOGY

## 2021-08-18 PROCEDURE — 3008F BODY MASS INDEX DOCD: CPT | Mod: CPTII,S$GLB,, | Performed by: OBSTETRICS & GYNECOLOGY

## 2021-08-18 PROCEDURE — 3008F PR BODY MASS INDEX (BMI) DOCUMENTED: ICD-10-PCS | Mod: CPTII,S$GLB,, | Performed by: OBSTETRICS & GYNECOLOGY

## 2021-08-18 PROCEDURE — 3078F DIAST BP <80 MM HG: CPT | Mod: CPTII,S$GLB,, | Performed by: OBSTETRICS & GYNECOLOGY

## 2021-08-18 PROCEDURE — 3078F PR MOST RECENT DIASTOLIC BLOOD PRESSURE < 80 MM HG: ICD-10-PCS | Mod: CPTII,S$GLB,, | Performed by: OBSTETRICS & GYNECOLOGY

## 2021-08-18 PROCEDURE — 1160F PR REVIEW ALL MEDS BY PRESCRIBER/CLIN PHARMACIST DOCUMENTED: ICD-10-PCS | Mod: CPTII,S$GLB,, | Performed by: OBSTETRICS & GYNECOLOGY

## 2021-08-18 PROCEDURE — 3074F PR MOST RECENT SYSTOLIC BLOOD PRESSURE < 130 MM HG: ICD-10-PCS | Mod: CPTII,S$GLB,, | Performed by: OBSTETRICS & GYNECOLOGY

## 2021-08-18 PROCEDURE — 1159F MED LIST DOCD IN RCRD: CPT | Mod: CPTII,S$GLB,, | Performed by: OBSTETRICS & GYNECOLOGY

## 2021-08-18 PROCEDURE — 1160F RVW MEDS BY RX/DR IN RCRD: CPT | Mod: CPTII,S$GLB,, | Performed by: OBSTETRICS & GYNECOLOGY

## 2021-08-18 RX ORDER — LEVONORGESTREL AND ETHINYL ESTRADIOL 0.1-0.02MG
1 KIT ORAL DAILY
Qty: 90 TABLET | Refills: 3 | Status: SHIPPED | OUTPATIENT
Start: 2021-08-18 | End: 2022-05-11 | Stop reason: SDUPTHER

## 2021-08-18 RX ORDER — BUSPIRONE HYDROCHLORIDE 15 MG/1
15 TABLET ORAL 3 TIMES DAILY
Qty: 90 TABLET | Refills: 3 | Status: SHIPPED | OUTPATIENT
Start: 2021-08-18 | End: 2022-05-11 | Stop reason: SDUPTHER

## 2022-05-11 ENCOUNTER — TELEPHONE (OUTPATIENT)
Dept: FAMILY MEDICINE | Facility: CLINIC | Age: 27
End: 2022-05-11
Payer: COMMERCIAL

## 2022-05-11 DIAGNOSIS — Z30.9 ENCOUNTER FOR CONTRACEPTIVE MANAGEMENT, UNSPECIFIED TYPE: ICD-10-CM

## 2022-05-11 DIAGNOSIS — F41.9 ANXIETY: ICD-10-CM

## 2022-05-11 RX ORDER — BUSPIRONE HYDROCHLORIDE 15 MG/1
15 TABLET ORAL 3 TIMES DAILY
Qty: 90 TABLET | Refills: 1 | Status: SHIPPED | OUTPATIENT
Start: 2022-05-11 | End: 2022-08-19 | Stop reason: SDUPTHER

## 2022-05-11 RX ORDER — LEVONORGESTREL AND ETHINYL ESTRADIOL 0.1-0.02MG
1 KIT ORAL DAILY
Qty: 90 TABLET | Refills: 1 | Status: SHIPPED | OUTPATIENT
Start: 2022-05-11 | End: 2022-08-19 | Stop reason: SDUPTHER

## 2022-08-19 ENCOUNTER — OFFICE VISIT (OUTPATIENT)
Dept: FAMILY MEDICINE | Facility: CLINIC | Age: 27
End: 2022-08-19
Payer: COMMERCIAL

## 2022-08-19 VITALS
SYSTOLIC BLOOD PRESSURE: 105 MMHG | DIASTOLIC BLOOD PRESSURE: 71 MMHG | TEMPERATURE: 97 F | OXYGEN SATURATION: 98 % | HEIGHT: 64 IN | RESPIRATION RATE: 16 BRPM | HEART RATE: 77 BPM | WEIGHT: 118 LBS | BODY MASS INDEX: 20.14 KG/M2

## 2022-08-19 DIAGNOSIS — Z01.419 WELL WOMAN EXAM WITH ROUTINE GYNECOLOGICAL EXAM: Primary | ICD-10-CM

## 2022-08-19 DIAGNOSIS — Z30.9 ENCOUNTER FOR CONTRACEPTIVE MANAGEMENT, UNSPECIFIED TYPE: ICD-10-CM

## 2022-08-19 DIAGNOSIS — F41.9 ANXIETY: ICD-10-CM

## 2022-08-19 DIAGNOSIS — Z12.4 ENCOUNTER FOR PAPANICOLAOU SMEAR FOR CERVICAL CANCER SCREENING: ICD-10-CM

## 2022-08-19 PROCEDURE — 99395 PR PREVENTIVE VISIT,EST,18-39: ICD-10-PCS | Mod: S$GLB,,, | Performed by: OBSTETRICS & GYNECOLOGY

## 2022-08-19 PROCEDURE — 99395 PREV VISIT EST AGE 18-39: CPT | Mod: S$GLB,,, | Performed by: OBSTETRICS & GYNECOLOGY

## 2022-08-19 RX ORDER — CLORAZEPATE DIPOTASSIUM 7.5 MG/1
7.5 TABLET ORAL EVERY 8 HOURS PRN
Qty: 30 TABLET | Refills: 1 | Status: SHIPPED | OUTPATIENT
Start: 2022-08-19 | End: 2022-09-18

## 2022-08-19 RX ORDER — LEVONORGESTREL AND ETHINYL ESTRADIOL 0.1-0.02MG
1 KIT ORAL DAILY
Qty: 90 TABLET | Refills: 3 | Status: SHIPPED | OUTPATIENT
Start: 2022-08-19 | End: 2022-11-17

## 2022-08-19 RX ORDER — BUSPIRONE HYDROCHLORIDE 15 MG/1
15 TABLET ORAL 3 TIMES DAILY
Qty: 270 TABLET | Refills: 2 | Status: SHIPPED | OUTPATIENT
Start: 2022-08-19 | End: 2022-11-17

## 2022-08-19 NOTE — PROGRESS NOTES
Subjective:      Patient ID: Ana Padilla is a 27 y.o. female who presents for evaluation today.    Chief Complaint:    Gynecologic Exam      History of Present Illness  HPI  Annual Exam (Premenopausal) - Patient presents for annual exam. The patient has no complaints today. The patient is sexually active. GYN screening history: last pap: was normal. The patient wears seatbelts: yes. The patient participates in regular exercise: yes. Has the patient ever been transfused or tattooed?: not asked. The patient reports that there is not domestic violence in her life.    GYN History  No LMP recorded. (Menstrual status: Birth Control).   Date of Last Pap: Pap smear completed today Up to date in accordance with ASCCP guidelines Pap smear schedule reviewed with patient Result history reviewed with patient    OB History    Para Term  AB Living   0 0 0 0 0 0   SAB IAB Ectopic Multiple Live Births   0 0 0 0 0       FAMILY HISTORY  Family History   Problem Relation Age of Onset    Breast cancer Maternal Grandmother        SOCIAL HISTORY  Social History     Tobacco Use   Smoking Status Never Smoker   Smokeless Tobacco Not on file   ,   Social History     Substance and Sexual Activity   Alcohol Use Yes    Comment: occasional        MEDICATIONS  No outpatient medications have been marked as taking for the 22 encounter (Office Visit) with Meenu Sandra NP.       Review of Systems   Review of Systems   Constitutional: Negative for appetite change, chills, fever and unexpected weight change.   HENT: Negative for mouth sores.    Eyes: Negative for visual disturbance.   Respiratory: Negative for cough and shortness of breath.    Cardiovascular: Negative for chest pain, palpitations and leg swelling.   Gastrointestinal: Negative for abdominal pain, blood in stool, nausea, vomiting and fecal incontinence.   Endocrine: Negative for hair loss and hot flashes.   Genitourinary: Negative for dysmenorrhea, dysuria,  "frequency, hematuria, menstrual problem, vaginal discharge, vaginal pain, urinary incontinence, vaginal dryness and vaginal odor.   Musculoskeletal: Negative for arthralgias, back pain, joint swelling and leg pain.   Integumentary:  Negative for rash, acne, hair changes, mole/lesion, breast mass, nipple discharge and breast skin changes.   Neurological: Negative for seizures and numbness.   Hematological: Negative for adenopathy. Does not bruise/bleed easily.   Psychiatric/Behavioral: Negative for depression and sleep disturbance. The patient is not nervous/anxious.    Breast: Negative for asymmetry, mass, mastodynia, nipple discharge and skin changes          Objective:    VITALS  BP Readings from Last 1 Encounters:   08/19/22 105/71   Weight: 53.5 kg (118 lb)   Height: 5' 4" (162.6 cm)   BMI Readings from Last 1 Encounters:   08/19/22 20.25 kg/m²        Physical Exam:   Constitutional: She is oriented to person, place, and time. She appears well-developed and well-nourished. No distress.    HENT:   Head: Normocephalic and atraumatic.   Right Ear: External ear normal.   Left Ear: External ear normal.     Neck: No tracheal deviation present. No thyromegaly present.    Cardiovascular: Normal rate.     Pulmonary/Chest: Effort normal. No stridor. No respiratory distress. Right breast exhibits no inverted nipple, no mass, no nipple discharge, no skin change, no tenderness, no bleeding and no swelling. Left breast exhibits no inverted nipple, no mass, no nipple discharge, no skin change, no tenderness, no bleeding and no swelling. Breasts are symmetrical.        Abdominal: Soft. Bowel sounds are normal. She exhibits no distension. There is no abdominal tenderness.     Genitourinary:    Inguinal canal, vagina, uterus, right adnexa, left adnexa and rectum normal.      Pelvic exam was performed with patient supine.   Labial bartholins normal.There is no rash, tenderness, lesion or injury on the right labia. There is no " rash, tenderness, lesion or injury on the left labia. Cervix is normal. Right adnexum displays no mass and no tenderness. Left adnexum displays no mass and no tenderness. No  no vaginal discharge in the vagina.    pap smear completed          Musculoskeletal: Normal range of motion and moves all extremeties.       Neurological: She is alert and oriented to person, place, and time.    Skin: Skin is warm and dry. No rash noted. She is not diaphoretic. No erythema. No pallor.    Psychiatric: She has a normal mood and affect. Her behavior is normal. Judgment and thought content normal.          Assessment & Plan:   Ana was seen today for gynecologic exam.    Diagnoses and all orders for this visit:    Well woman exam with routine gynecological exam  Patient was counseled today on current ASCCP pap smear guidelines, the recommendation for yearly pelvic and clinical breast exams, healthy diet consumption, implementing exercise routines 4-5x/week, when to begin mammogram screenings, and breast self awareness. She is to see her PCP for other health maintenance.       Encounter for Papanicolaou smear for cervical cancer screening  -     Liquid-based pap smear, screening  Pap smear obtained today, will follow up with patient when results are received.      Encounter for contraceptive management, unspecified type  -     levonorgestrel-ethinyl estradiol (AVIANE,ALESSE,LESSINA) 0.1-20 mg-mcg per tablet; Take 1 tablet by mouth once daily.  The patient verbalizes desire to continue with current contraceptive regimen. The risks associated with oral contraceptive use, with special emphasis placed on the risk for stroke, blood clots, MI and death were reviewed with the patient. The patient was provided with the opportunity to ask questions and verbalize concerns. Patient education was provided on the following aspects associated with oral contraceptive use:   Take the pill at the same time every day, set an alarm to serve as a  reminder if necessary    If nausea or vomiting occurs when taking the medication, take with food or at bedtime   Missing doses may lead to breakthrough bleeding    Your periods may become lighter and/or shorter in duration than usual   If a pill is missed for one day, take 2 pills the next day   If two consecutive days are missed, take 2 pills for the next 2 days to catch up and finish the birth control pack. Utilize a backup method of contraception for the remainder of the pill pack.   Certain medications, especially antibiotics, may decrease the effectiveness of the pill   Oral contraceptives do not provide protection against sexually transmitted diseases  If any of the following symptoms are experiencing report to the nearest emergency room for further evaluation and discontinue the pill immediately.   Severe abdominal pain   Severe chest pain   Severe headache or paresthesia    Changes in vision   Severe leg pain or swelling   Shortness of breath and increased heart rate   Discussed by taking the pill, the patient consents to knowing and understanding the risks associated with oral contraceptive therapy. The patient verbalizes understanding of the components discussed and denies additional questions at this time.    Anxiety  -     busPIRone (BUSPAR) 15 MG tablet; Take 1 tablet (15 mg total) by mouth 3 (three) times daily.  Educated regarding non-pharmacological methods for dealing with symptoms of anxiety such as, relaxation techniques, deep breathing exercises, exercise, etc. Discussion of pharmacological management was carried out at length. Medication regimen, side effects, mechanism of action, etc. discussed.  Please note the following aspects associated with the treatment regimen:   When first starting medication or making dose adjustments, it may take 2-4 weeks to begin feeling the effects of medication   Avoid abrupt withdrawal from treatment. If you wish to discontinue medication therapy,  please contact the office before doing so to discuss how to safely get off the medication.   Instructed to report immediately to the nearest ER with any thoughts of suicidal or homicidal ideation. Please notify provider immediately.  o Resources are available for dealing with suicidal thoughts: 2-689-920-GGSO ([phone]) or www.suicidepreventionlifeline.org    Contact provider immediately if symptoms worsen or new symptoms appear.   Report to the nearest emergency room if symptoms of serotonin syndrome are experienced such as sudden onset of high fever, muscular rigidity, mental status changes, hyperreflexia/clonus, and uncontrolled shivering   No evidence of abuse/diversion/addiction suspected.  checked prior to prescribing with clear report. Patient is aware of side effects to report, risks associated with medication usage, etc. Pt understands the nature of the controlled substances. States She will not share or otherwise make the medication available to others.       Patient instructed to contact the clinic should any questions or concerns arise prior to her next office visit. Patient is happy with the plan of care at this time, verbalizes understanding and denies outstanding questions.

## 2022-08-23 LAB — Lab: NORMAL

## 2022-08-24 ENCOUNTER — TELEPHONE (OUTPATIENT)
Dept: FAMILY MEDICINE | Facility: CLINIC | Age: 27
End: 2022-08-24
Payer: COMMERCIAL

## 2022-08-24 NOTE — TELEPHONE ENCOUNTER
----- Message from Meenu Sandra NP sent at 8/23/2022  9:37 AM CDT -----  Please call and inform patient recent pap smear testing came back within normal limits.

## 2023-02-02 ENCOUNTER — OFFICE VISIT (OUTPATIENT)
Dept: FAMILY MEDICINE | Facility: CLINIC | Age: 28
End: 2023-02-02
Payer: COMMERCIAL

## 2023-02-02 VITALS
DIASTOLIC BLOOD PRESSURE: 75 MMHG | RESPIRATION RATE: 14 BRPM | BODY MASS INDEX: 20.14 KG/M2 | TEMPERATURE: 97 F | HEART RATE: 70 BPM | SYSTOLIC BLOOD PRESSURE: 106 MMHG | WEIGHT: 118 LBS | HEIGHT: 64 IN | OXYGEN SATURATION: 98 %

## 2023-02-02 DIAGNOSIS — J01.10 ACUTE NON-RECURRENT FRONTAL SINUSITIS: ICD-10-CM

## 2023-02-02 DIAGNOSIS — M54.9 OTHER ACUTE BACK PAIN: Primary | ICD-10-CM

## 2023-02-02 PROCEDURE — 99213 OFFICE O/P EST LOW 20 MIN: CPT | Mod: 25,S$GLB,, | Performed by: OBSTETRICS & GYNECOLOGY

## 2023-02-02 PROCEDURE — 99213 PR OFFICE/OUTPT VISIT, EST, LEVL III, 20-29 MIN: ICD-10-PCS | Mod: 25,S$GLB,, | Performed by: OBSTETRICS & GYNECOLOGY

## 2023-02-02 PROCEDURE — 1160F RVW MEDS BY RX/DR IN RCRD: CPT | Mod: CPTII,S$GLB,, | Performed by: OBSTETRICS & GYNECOLOGY

## 2023-02-02 PROCEDURE — 96372 PR INJECTION,THERAP/PROPH/DIAG2ST, IM OR SUBCUT: ICD-10-PCS | Mod: S$GLB,,, | Performed by: OBSTETRICS & GYNECOLOGY

## 2023-02-02 PROCEDURE — 3078F DIAST BP <80 MM HG: CPT | Mod: CPTII,S$GLB,, | Performed by: OBSTETRICS & GYNECOLOGY

## 2023-02-02 PROCEDURE — 1160F PR REVIEW ALL MEDS BY PRESCRIBER/CLIN PHARMACIST DOCUMENTED: ICD-10-PCS | Mod: CPTII,S$GLB,, | Performed by: OBSTETRICS & GYNECOLOGY

## 2023-02-02 PROCEDURE — 3008F PR BODY MASS INDEX (BMI) DOCUMENTED: ICD-10-PCS | Mod: CPTII,S$GLB,, | Performed by: OBSTETRICS & GYNECOLOGY

## 2023-02-02 PROCEDURE — 3008F BODY MASS INDEX DOCD: CPT | Mod: CPTII,S$GLB,, | Performed by: OBSTETRICS & GYNECOLOGY

## 2023-02-02 PROCEDURE — 96372 THER/PROPH/DIAG INJ SC/IM: CPT | Mod: S$GLB,,, | Performed by: OBSTETRICS & GYNECOLOGY

## 2023-02-02 PROCEDURE — 3078F PR MOST RECENT DIASTOLIC BLOOD PRESSURE < 80 MM HG: ICD-10-PCS | Mod: CPTII,S$GLB,, | Performed by: OBSTETRICS & GYNECOLOGY

## 2023-02-02 PROCEDURE — 3074F PR MOST RECENT SYSTOLIC BLOOD PRESSURE < 130 MM HG: ICD-10-PCS | Mod: CPTII,S$GLB,, | Performed by: OBSTETRICS & GYNECOLOGY

## 2023-02-02 PROCEDURE — 3074F SYST BP LT 130 MM HG: CPT | Mod: CPTII,S$GLB,, | Performed by: OBSTETRICS & GYNECOLOGY

## 2023-02-02 PROCEDURE — 1159F PR MEDICATION LIST DOCUMENTED IN MEDICAL RECORD: ICD-10-PCS | Mod: CPTII,S$GLB,, | Performed by: OBSTETRICS & GYNECOLOGY

## 2023-02-02 PROCEDURE — 1159F MED LIST DOCD IN RCRD: CPT | Mod: CPTII,S$GLB,, | Performed by: OBSTETRICS & GYNECOLOGY

## 2023-02-02 RX ORDER — DEXAMETHASONE SODIUM PHOSPHATE 4 MG/ML
4 INJECTION, SOLUTION INTRA-ARTICULAR; INTRALESIONAL; INTRAMUSCULAR; INTRAVENOUS; SOFT TISSUE
Status: COMPLETED | OUTPATIENT
Start: 2023-02-02 | End: 2023-02-02

## 2023-02-02 RX ORDER — MELOXICAM 15 MG/1
15 TABLET ORAL DAILY
Qty: 14 TABLET | Refills: 0 | Status: SHIPPED | OUTPATIENT
Start: 2023-02-02 | End: 2023-11-10

## 2023-02-02 RX ADMIN — DEXAMETHASONE SODIUM PHOSPHATE 4 MG: 4 INJECTION, SOLUTION INTRA-ARTICULAR; INTRALESIONAL; INTRAMUSCULAR; INTRAVENOUS; SOFT TISSUE at 10:02

## 2023-02-02 NOTE — PROGRESS NOTES
Subjective:   Patient ID: Ana Padilla is a 28 y.o. female who presents for evaluation today.    Chief Complaint:  Back Pain (Patient states that it has been bruised and hurting for about a month now. She is not sure what she did. /She also woke up with sinus issues. )      History of Present Illness  HPI  Patient presents today for back pain. This started a few weeks ago. She states it feels like she has a bruise on her spine. It is located right in the middle of her shoulder blades. She doesn't recall hitting it on anything, etc. Started off as being sore after doing some heavy lifting but has not resolved.    Sinus Pain  Patient reports symptoms of congestion, headaches, and post nasal drip.   Onset of symptoms was a few days ago.   Symptoms have been gradually worsening since that time.   She is drinking plenty of fluids.    Past history is significant for nothing.   Patient is non-smoker.    FAMILY HISTORY  Family History   Problem Relation Age of Onset    Breast cancer Maternal Grandmother      SOCIAL HISTORY  Social History     Tobacco Use   Smoking Status Never   Smokeless Tobacco Not on file   ,   Social History     Substance and Sexual Activity   Alcohol Use Yes    Comment: occasional     MEDICATIONS  No outpatient medications have been marked as taking for the 2/2/23 encounter (Office Visit) with Meenu Sandra NP.     Review of Systems   Review of Systems   Constitutional:  Negative for activity change, appetite change, fever and unexpected weight change.   HENT:  Positive for nasal congestion and postnasal drip. Negative for dental problem, hearing loss, sneezing, sore throat, trouble swallowing and voice change.    Eyes:  Negative for visual disturbance.   Respiratory:  Negative for choking, chest tightness, shortness of breath and stridor.    Cardiovascular:  Negative for chest pain and palpitations.   Gastrointestinal:  Negative for abdominal pain, anal bleeding, blood in stool, change in bowel  "habit, nausea, vomiting, fecal incontinence and change in bowel habit.   Endocrine: Negative for polydipsia, polyphagia and polyuria.   Genitourinary:  Negative for decreased urine volume, difficulty urinating, dysuria, frequency, hematuria and urgency.   Musculoskeletal:  Positive for back pain. Negative for gait problem, joint swelling, neck pain, neck stiffness and joint deformity.   Integumentary:  Negative for color change, pallor, rash, wound and mole/lesion.   Allergic/Immunologic: Negative for immunocompromised state.   Neurological:  Positive for headaches. Negative for vertigo, seizures, syncope, weakness, light-headedness, coordination difficulties and coordination difficulties.   Hematological:  Negative for adenopathy. Does not bruise/bleed easily.   Psychiatric/Behavioral:  Negative for agitation, behavioral problems, confusion, hallucinations, sleep disturbance and suicidal ideas.        Objective:    VITALS  BP Readings from Last 1 Encounters:   02/02/23 106/75   Weight: 53.5 kg (118 lb)   Height: 5' 4" (162.6 cm)   BMI Readings from Last 1 Encounters:   02/02/23 20.25 kg/m²       Physical Exam  Vitals reviewed.   Constitutional:       General: She is not in acute distress.     Appearance: Normal appearance. She is not ill-appearing, toxic-appearing or diaphoretic.   HENT:      Head: Normocephalic and atraumatic.      Right Ear: External ear normal. Tympanic membrane is not erythematous, retracted or bulging.      Left Ear: External ear normal. Tympanic membrane is not erythematous, retracted or bulging.      Nose: Nose normal. No congestion or rhinorrhea.      Right Turbinates: Not enlarged, swollen or pale.      Left Turbinates: Not enlarged, swollen or pale.      Mouth/Throat:      Mouth: Mucous membranes are moist.      Pharynx: Oropharynx is clear. No pharyngeal swelling, oropharyngeal exudate or posterior oropharyngeal erythema.   Eyes:      General:         Right eye: No discharge.         " Left eye: No discharge.   Neck:      Thyroid: No thyroid mass, thyromegaly or thyroid tenderness.   Cardiovascular:      Rate and Rhythm: Normal rate and regular rhythm.      Heart sounds: Normal heart sounds. No murmur heard.    No friction rub. No gallop.   Pulmonary:      Effort: Pulmonary effort is normal. No respiratory distress.      Breath sounds: Normal breath sounds. No stridor. No wheezing, rhonchi or rales.   Musculoskeletal:         General: Normal range of motion.      Cervical back: Normal range of motion and neck supple.      Right lower leg: No edema.      Left lower leg: No edema.   Skin:     General: Skin is warm and dry.      Coloration: Skin is not jaundiced or pale.      Findings: No rash.   Neurological:      General: No focal deficit present.      Mental Status: She is alert and oriented to person, place, and time.      Gait: Gait normal.   Psychiatric:         Mood and Affect: Mood normal.         Behavior: Behavior normal.         Thought Content: Thought content normal.         Judgment: Judgment normal.       Assessment:      1. Other acute back pain    2. Acute non-recurrent frontal sinusitis       Plan:   Other acute back pain  -     meloxicam (MOBIC) 15 MG tablet; Take 1 tablet (15 mg total) by mouth once daily.  Dispense: 14 tablet; Refill: 0  Medication regimen, side effects & necessary monitoring discussed with patient. Alarm signs and symptoms reviewed, present to ER if experiencing.      Acute non-recurrent frontal sinusitis  -     dexAMETHasone injection 4 mg  Nasal saline sprays as needed for congestion. Recommended antihistamines & intranasal steroids.     Increase rest, warm salt gargles for throat irritation, sleep with HOB elevated to promote drainage. Increase oral fluid intake to assist with thinning any mucus secretions. Use a humidifier in the bedroom, warm compresses on the face & steam inhalation.          Patient instructed to contact the clinic should any questions or  concerns arise prior to next office visit. Risks, benefits, and alternatives discussed with patient. Patient verbalized understanding of discussed plan of care. Asked patient if any further questions, answered no. Follow up as discussed or sooner PRN.

## 2023-09-25 ENCOUNTER — PATIENT MESSAGE (OUTPATIENT)
Dept: OBSTETRICS AND GYNECOLOGY | Facility: CLINIC | Age: 28
End: 2023-09-25
Payer: COMMERCIAL

## 2023-10-09 ENCOUNTER — TELEPHONE (OUTPATIENT)
Dept: OBSTETRICS AND GYNECOLOGY | Facility: CLINIC | Age: 28
End: 2023-10-09
Payer: COMMERCIAL

## 2023-10-09 NOTE — TELEPHONE ENCOUNTER
Spoke with patient. I explained why it is important that we do not move the appointment up a week. We could possibly move it back. Patient did not want to do this. Patient will keep her appointment.

## 2023-10-09 NOTE — TELEPHONE ENCOUNTER
----- Message from Nicole Gates sent at 10/9/2023  8:51 AM CDT -----  Contact: self  Type: Staff Message  Caller: Ana Padilla  Call Back Number: [phone]  Nature of the Call: pt is needing to reschedule 10/25 apts because  will be out of town. She would like to move the apt up a week. It's for ultrasound and pregnancy confirmation  Additional Information: na

## 2023-10-20 DIAGNOSIS — Z32.00 POSSIBLE PREGNANCY: Primary | ICD-10-CM

## 2023-10-25 ENCOUNTER — PROCEDURE VISIT (OUTPATIENT)
Dept: OBSTETRICS AND GYNECOLOGY | Facility: CLINIC | Age: 28
End: 2023-10-25
Payer: COMMERCIAL

## 2023-10-25 ENCOUNTER — OFFICE VISIT (OUTPATIENT)
Dept: OBSTETRICS AND GYNECOLOGY | Facility: CLINIC | Age: 28
End: 2023-10-25
Payer: COMMERCIAL

## 2023-10-25 VITALS
HEIGHT: 64 IN | WEIGHT: 119 LBS | DIASTOLIC BLOOD PRESSURE: 70 MMHG | BODY MASS INDEX: 20.32 KG/M2 | SYSTOLIC BLOOD PRESSURE: 104 MMHG | HEART RATE: 77 BPM

## 2023-10-25 DIAGNOSIS — Z32.00 POSSIBLE PREGNANCY: ICD-10-CM

## 2023-10-25 DIAGNOSIS — Z32.00 POSSIBLE PREGNANCY: Primary | ICD-10-CM

## 2023-10-25 DIAGNOSIS — Z34.91 FIRST TRIMESTER PREGNANCY: Primary | ICD-10-CM

## 2023-10-25 PROCEDURE — 76801 US OB/GYN PROCEDURE (VIEWPOINT): ICD-10-PCS | Mod: S$GLB,,, | Performed by: OBSTETRICS & GYNECOLOGY

## 2023-10-25 PROCEDURE — 0500F INITIAL PRENATAL CARE VISIT: CPT | Mod: CPTII,S$GLB,, | Performed by: OBSTETRICS & GYNECOLOGY

## 2023-10-25 PROCEDURE — 3078F PR MOST RECENT DIASTOLIC BLOOD PRESSURE < 80 MM HG: ICD-10-PCS | Mod: CPTII,S$GLB,, | Performed by: OBSTETRICS & GYNECOLOGY

## 2023-10-25 PROCEDURE — 3078F DIAST BP <80 MM HG: CPT | Mod: CPTII,S$GLB,, | Performed by: OBSTETRICS & GYNECOLOGY

## 2023-10-25 PROCEDURE — 0500F PR INITIAL PRENATAL CARE VISIT: ICD-10-PCS | Mod: CPTII,S$GLB,, | Performed by: OBSTETRICS & GYNECOLOGY

## 2023-10-25 PROCEDURE — 3074F SYST BP LT 130 MM HG: CPT | Mod: CPTII,S$GLB,, | Performed by: OBSTETRICS & GYNECOLOGY

## 2023-10-25 PROCEDURE — 1159F PR MEDICATION LIST DOCUMENTED IN MEDICAL RECORD: ICD-10-PCS | Mod: CPTII,S$GLB,, | Performed by: OBSTETRICS & GYNECOLOGY

## 2023-10-25 PROCEDURE — 1159F MED LIST DOCD IN RCRD: CPT | Mod: CPTII,S$GLB,, | Performed by: OBSTETRICS & GYNECOLOGY

## 2023-10-25 PROCEDURE — 76801 OB US < 14 WKS SINGLE FETUS: CPT | Mod: S$GLB,,, | Performed by: OBSTETRICS & GYNECOLOGY

## 2023-10-25 PROCEDURE — 3074F PR MOST RECENT SYSTOLIC BLOOD PRESSURE < 130 MM HG: ICD-10-PCS | Mod: CPTII,S$GLB,, | Performed by: OBSTETRICS & GYNECOLOGY

## 2023-10-25 RX ORDER — ONDANSETRON 4 MG/1
4 TABLET, FILM COATED ORAL EVERY 6 HOURS PRN
Qty: 40 TABLET | Refills: 2 | Status: SHIPPED | OUTPATIENT
Start: 2023-10-25 | End: 2024-10-24

## 2023-10-25 NOTE — PROGRESS NOTES
CHIEF COMPLAINT:   Patient presents with      initial OB        HISTORY OF PRESENT ILLNESS  Ana Padilla 28 y.o.  presents for initial OB  The patient has no complaints today.  Nausea discussed and fluid intake.    No bleeding or pain.    Genetic testing is discussed in detail with her .       OB history:  Primigravida    LMP: Patient's last menstrual period was 08/10/2023 (exact date).  EDC: Estimated Date of Delivery: 24  EGA: 9w5d       Health Maintenance   Topic Date Due    Hepatitis C Screening  Never done    Lipid Panel  Never done    TETANUS VACCINE  Never done    Pap Smear  2025       History reviewed. No pertinent past medical history.    Past Surgical History:   Procedure Laterality Date    WISDOM TOOTH EXTRACTION         Family History   Problem Relation Age of Onset    Breast cancer Maternal Grandmother        Social History     Socioeconomic History    Marital status:    Tobacco Use    Smoking status: Never   Substance and Sexual Activity    Alcohol use: Yes     Comment: occasional    Drug use: Never    Sexual activity: Yes     Partners: Male       Current Outpatient Medications   Medication Sig Dispense Refill    busPIRone (BUSPAR) 15 MG tablet Take 1 tablet (15 mg total) by mouth 3 (three) times daily. 270 tablet 2    clorazepate (TRANXENE) 7.5 MG Tab Take 1 tablet (7.5 mg total) by mouth every 8 (eight) hours as needed (anxiety). 30 tablet 1    levonorgestrel-ethinyl estradiol (AVIANE,ALESSE,LESSINA) 0.1-20 mg-mcg per tablet Take 1 tablet by mouth once daily. 90 tablet 3    meloxicam (MOBIC) 15 MG tablet Take 1 tablet (15 mg total) by mouth once daily. 14 tablet 0     No current facility-administered medications for this visit.       Review of patient's allergies indicates:  No Known Allergies      PHYSICAL EXAM   Vitals:    10/25/23 1017   BP: 104/70   Pulse: 77        PAIN SCALE: 0/10 None    PHYSICAL EXAM    ROS:  GENERAL: No fever, chills, fatigability.  CV: Denies chest  pain  PULM: Denies shortness of breath or wheezing.  REPRODUCTIVE: No abnormal vaginal bleeding.       PE:   APPEARANCE: Well nourished, well developed, in no acute distress       UPT +    A/P:     -      Patient was counseled today on A.C.S. Pap guidelines and recommendations for yearly pelvic exams, mammograms and monthly self breast exams; to see her PCP for other health maintenance and pregnancy.   -      Patient's medications and medical history reviewed with patient and implications in pregnancy.   -      Pregnancy course discussed and 'AtoZ' book given. Patient was counseled on proper weight gain based on the [employer]'s recommendations based on her pre-pregnancy weight. Discussed foods to avoid in pregnancy (i.e. sushi, fish that are high in mercury, deli meat, and unpasteurized cheeses). Discussed prenatal vitamin options (i.e. stool softener, DHA). Discussed potential medical problems in pregnancy.  -     Discussed risk of Toxoplasmosis transmission from pets and reviewed risk reduction techniques.  -     Pt was counseled on exercise in pregnancy and weight gain recommendations.  -     Pt was counseled on travel recommendations and on risks of Covid 19 virus exposure.    -     Covid prevention reviewed with patient  -     Oriented to practice including call coverage     -       Pt is aware we call all results. If she does not hear from our office regarding her result within a week of having a study or procedure performed she is to call the office so that we can research the result for her as I do not know when she is scheduling her procedures.

## 2023-11-01 ENCOUNTER — TELEPHONE (OUTPATIENT)
Dept: OBSTETRICS AND GYNECOLOGY | Facility: CLINIC | Age: 28
End: 2023-11-01
Payer: COMMERCIAL

## 2023-11-01 NOTE — TELEPHONE ENCOUNTER
Phone call returned to patient.  No answer. I left a message on patient's voicemail.  I will attempt to reach at a later time.

## 2023-11-01 NOTE — TELEPHONE ENCOUNTER
----- Message from Alena Herzog MA sent at 11/1/2023  1:35 PM CDT -----  Contact: self  Sd patient, looks like she is newly pregnant, wanting to switch to Dr Feng.   ----- Message -----  From: Nicole Gates  Sent: 11/1/2023   1:10 PM CDT  To: [facility] III Staff    Type: Staff Message  Caller: Ana Padilla  Call Back Number: [phone]  Nature of the Call: pt wanting to switch provider because Sd is leaving  Additional Information: joseph

## 2023-11-02 ENCOUNTER — TELEPHONE (OUTPATIENT)
Dept: OBSTETRICS AND GYNECOLOGY | Facility: CLINIC | Age: 28
End: 2023-11-02
Payer: COMMERCIAL

## 2023-11-02 DIAGNOSIS — Z34.90 PREGNANCY, UNSPECIFIED GESTATIONAL AGE: Primary | ICD-10-CM

## 2023-11-02 NOTE — TELEPHONE ENCOUNTER
Phone call returned to patient.  She is transferring from Dr. Beaver's care  She has orders for OB panel  She is advised to have drawn two days prior to her scheduled appointment

## 2023-11-04 LAB
MULTIPLE ORDERS: NORMAL
PROGEST SERPL-MCNC: 33.4 NG/ML

## 2023-11-07 LAB
ABS NRBC COUNT: 0 X 10 3/UL (ref 0–0.01)
ABSOLUTE BASOPHIL: 0.03 X 10 3/UL (ref 0–0.22)
ABSOLUTE EOSINOPHIL: 0.06 X 10 3/UL (ref 0.04–0.54)
ABSOLUTE IMMATURE GRAN: 0.03 X 10 3/UL (ref 0–0.04)
ABSOLUTE LYMPHOCYTE: 1.58 X 10 3/UL (ref 0.86–4.75)
ABSOLUTE MONOCYTE: 0.36 X 10 3/UL (ref 0.22–1.08)
ADDITIONAL TESTING: NORMAL
AMORPH URATE CRY URNS QL MICRO: NEGATIVE
ANTIBODY SCREEN: NEGATIVE
BACTERIA #/AREA URNS HPF: ABNORMAL /[HPF]
BASOPHILS NFR BLD: 0.4 % (ref 0.2–1.2)
BILIRUB UR QL STRIP: NEGATIVE
BLOOD GROUPING: NORMAL
BLOOD TYPE (D): POSITIVE
CLARITY UR: ABNORMAL
COLOR UR: YELLOW
EOSINOPHIL NFR BLD: 0.8 % (ref 0.7–7)
EPITHELIAL CELLS: ABNORMAL
GLUCOSE (UA): 50 MG/DL
HBV SURFACE AG SERPL QL IA: NONREACTIVE
HCT VFR BLD AUTO: 38.8 % (ref 37–47)
HEMOGLOBIN A1: 97.2 % (ref 96–99)
HEMOGLOBIN A2: 2.8 % (ref 1.5–3.5)
HEMOGLOBIN C: NORMAL
HEMOGLOBIN F: NORMAL
HEMOGLOBIN S: NORMAL
HEP BSAG CONFIRMATION: NORMAL
HGB BLD-MCNC: 13.4 G/DL (ref 12–16)
HGB FRACT BLD-IMP: NORMAL
HIV 1+2 AB+HIV1 P24 AG SERPL QL IA: NONREACTIVE
HIV TESTING:: NORMAL
HSV1 IGG SER-ACNC: NONREACTIVE
HSV2 IGG SER-ACNC: NONREACTIVE
IMMATURE GRANULOCYTES: 0.4 % (ref 0–0.5)
KETONES UR QL STRIP: 15 MG/DL
LEUKOCYTE ESTERASE UR QL STRIP: ABNORMAL
LYMPHOCYTES NFR BLD: 21.8 % (ref 19.3–53.1)
MCH RBC QN AUTO: 32.5 PG (ref 27–32)
MCHC RBC AUTO-ENTMCNC: 34.5 G/DL (ref 32–36)
MCV RBC AUTO: 94.2 FL (ref 82–100)
MONOCYTES NFR BLD: 5 % (ref 4.7–12.5)
MUCOUS THREADS URNS QL MICRO: ABNORMAL
NEUTROPHILS # BLD AUTO: 5.2 X 10 3/UL (ref 2.15–7.56)
NEUTROPHILS NFR BLD: 71.6 % (ref 34–71.1)
NITRITE UR QL STRIP: NEGATIVE
NUCLEATED RED BLOOD CELLS: 0 /100 WBC (ref 0–0.2)
OCCULT BLOOD: NEGATIVE
PH, URINE: 6 (ref 5–7.5)
PLATELET # BLD AUTO: 203 X 10 3/UL (ref 135–400)
PROT UR QL STRIP: NEGATIVE MG/DL
RBC # BLD AUTO: 4.12 X 10 6/UL (ref 4.2–5.4)
RBC/HPF: NEGATIVE
RDW-SD: 43.2 FL (ref 37–54)
RUBELLA IGG SCREEN: NORMAL
SP GR UR STRIP: 1.01 (ref 1–1.03)
SYPHILIS TREPONEMAL ANTIBODY: NONREACTIVE
T4, FREE: 1.59 NG/DL (ref 0.93–1.7)
TSH SERPL DL<=0.005 MIU/L-ACNC: 0.73 UIU/ML (ref 0.27–4.2)
URINE CULTURE, ROUTINE: NORMAL
UROBILINOGEN, URINE: NORMAL E.U./DL (ref 0–1)
WBC # BLD: 7.26 X 10 3/UL (ref 4.3–10.8)
WBC/HPF: ABNORMAL

## 2023-11-10 ENCOUNTER — CLINICAL SUPPORT (OUTPATIENT)
Dept: OBSTETRICS AND GYNECOLOGY | Facility: CLINIC | Age: 28
End: 2023-11-10
Payer: COMMERCIAL

## 2023-11-10 VITALS
HEART RATE: 108 BPM | BODY MASS INDEX: 20.56 KG/M2 | WEIGHT: 119.81 LBS | SYSTOLIC BLOOD PRESSURE: 116 MMHG | DIASTOLIC BLOOD PRESSURE: 82 MMHG

## 2023-11-10 DIAGNOSIS — Z3A.12 12 WEEKS GESTATION OF PREGNANCY: Primary | ICD-10-CM

## 2023-11-10 NOTE — PROGRESS NOTES
OB INTAKE  Transfer of Prenatal Care from Dr. Beaver  She denies any concerns.  She reports having [facility] Testing drawn last week.    OB labs pending.   Flu Vaccine and Tdap VIS given to patient

## 2023-11-20 ENCOUNTER — TELEPHONE (OUTPATIENT)
Dept: OBSTETRICS AND GYNECOLOGY | Facility: CLINIC | Age: 28
End: 2023-11-20
Payer: COMMERCIAL

## 2023-11-20 NOTE — TELEPHONE ENCOUNTER
Called and left patient a voicemail to inform her that her Fores[facility] results were normal.     Yaritza JON

## 2023-11-29 ENCOUNTER — INITIAL PRENATAL (OUTPATIENT)
Dept: OBSTETRICS AND GYNECOLOGY | Facility: CLINIC | Age: 28
End: 2023-11-29
Payer: COMMERCIAL

## 2023-11-29 VITALS
DIASTOLIC BLOOD PRESSURE: 73 MMHG | SYSTOLIC BLOOD PRESSURE: 106 MMHG | WEIGHT: 122 LBS | HEART RATE: 86 BPM | BODY MASS INDEX: 20.94 KG/M2

## 2023-11-29 DIAGNOSIS — Z34.90 PREGNANCY, UNSPECIFIED GESTATIONAL AGE: Primary | ICD-10-CM

## 2023-11-29 DIAGNOSIS — Z01.419 WELL WOMAN EXAM WITH ROUTINE GYNECOLOGICAL EXAM: ICD-10-CM

## 2023-11-29 PROCEDURE — 0500F PR INITIAL PRENATAL CARE VISIT: ICD-10-PCS | Mod: CPTII,S$GLB,, | Performed by: OBSTETRICS & GYNECOLOGY

## 2023-11-29 PROCEDURE — 0500F INITIAL PRENATAL CARE VISIT: CPT | Mod: CPTII,S$GLB,, | Performed by: OBSTETRICS & GYNECOLOGY

## 2023-11-29 NOTE — PROGRESS NOTES
CHIEF COMPLAINT:   Patient presents with      initial OB        HISTORY OF PRESENT ILLNESS  Ana Padilla 28 y.o.  presents for initial OB  The patient has no complaints today.  Nausea discussed and fluid intake including sprite, gator aid, cool aid,    No bleeding or pain.    Genetic testing is discussed in detail with her .   Denies chemical/pesticide/radiation exposure.    OB history:  [unfilled]    LMP: Patient's last menstrual period was 08/10/2023.  EDC: Estimated Date of Delivery: 24  EGA: 14w5d       Health Maintenance   Topic Date Due    Hepatitis C Screening  Never done    Lipid Panel  Never done    TETANUS VACCINE  Never done    Pap Smear  2025       History reviewed. No pertinent past medical history.    Past Surgical History:   Procedure Laterality Date    WISDOM TOOTH EXTRACTION         Family History   Problem Relation Age of Onset    Diabetes Maternal Grandmother     Breast cancer Maternal Grandmother     Cancer Mother         Neuro endocrine cancer    Colon cancer Neg Hx     Eclampsia Neg Hx     Hypertension Neg Hx     Miscarriages / Stillbirths Neg Hx     Ovarian cancer Neg Hx      labor Neg Hx     Stroke Neg Hx     Uterine cancer Neg Hx     Cervical cancer Neg Hx        Social History     Socioeconomic History    Marital status:    Tobacco Use    Smoking status: Never   Substance and Sexual Activity    Alcohol use: Not Currently     Comment: occasional    Drug use: Never    Sexual activity: Yes     Partners: Male       Current Outpatient Medications   Medication Sig Dispense Refill    prenatal vit/iron fum/folic ac (PRENATAL 1+1 ORAL) Take by mouth.      busPIRone (BUSPAR) 15 MG tablet Take 1 tablet (15 mg total) by mouth 3 (three) times daily. 270 tablet 2    clorazepate (TRANXENE) 7.5 MG Tab Take 1 tablet (7.5 mg total) by mouth every 8 (eight) hours as needed (anxiety). 30 tablet 1    levonorgestrel-ethinyl estradiol (AVIANE,ALESSE,LESSINA) 0.1-20 mg-mcg per tablet  Take 1 tablet by mouth once daily. 90 tablet 3    ondansetron (ZOFRAN) 4 MG tablet Take 1 tablet (4 mg total) by mouth every 6 (six) hours as needed for Nausea. (Patient not taking: Reported on 11/29/2023) 40 tablet 2     No current facility-administered medications for this visit.       Review of patient's allergies indicates:  No Known Allergies      PHYSICAL EXAM   Vitals:    11/29/23 1544   BP: 106/73   Pulse: 86        PAIN SCALE: 0/10 None    PHYSICAL EXAM    ROS:  GENERAL: No fever, chills, fatigability.  CV: Denies chest pain  PULM: Denies shortness of breath or wheezing.  ABDOMEN denies  abdominal pain, hematemesis or blood in stool.  URINARY: No flank pain, dysuria or hematuria.  REPRODUCTIVE: No abnormal vaginal bleeding.       PE:   APPEARANCE: Well nourished, well developed, in no acute distress  CHEST: Clear to auscultation bilaterally  CV: Regular rate and rhythm  BREASTS: Symmetrical, no skin changes or visible lesions. No palpable masses, nipple discharge or adenopathy bilaterally. Mild bilateral fibrocystic changes  ABDOMEN: Soft. No tenderness or masses. No hepatosplenomegaly. No hernias  PELVIC:   VULVA: No lesions. Normal female genitalia.  URETHRAL MEATUS: Normal size and location, no lesions, no prolapse.  URETHRA: No masses, tenderness, prolapse or scarring.  VAGINA: Moist and well rugated, no discharge, no significant cystocele or rectocele.  CERVIX: No lesions, normal diameter, no stenosis, no cervical motion tenderness.   UTERUS: 15 size, regular shape, mobile, non-tender, normal position, good support.  ADNEXA: No masses, tenderness or CDS nodularity.  ANUS PERINEUM: No lesions, no relaxation, no external hemorrhoids.     UPT +    A/P:     -      Patient was counseled today on A.C.S. Pap guidelines and recommendations for yearly pelvic exams, mammograms and monthly self breast exams; to see her PCP for other health maintenance and pregnancy.   -      Patient's medications and medical  history reviewed with patient and implications in pregnancy.   -      Pregnancy course discussed and 'AtoZ' book given. Patient was counseled on proper weight gain based on the [employer]'s recommendations based on her pre-pregnancy weight. Discussed foods to avoid in pregnancy (i.e. sushi, fish that are high in mercury, deli meat, and unpasteurized cheeses). Discussed prenatal vitamin options (i.e. stool softener, DHA). Discussed potential medical problems in pregnancy.  -     Discussed risk of Toxoplasmosis transmission from pets and reviewed risk reduction techniques.  -    -     Chromosomal abnormality risk discussed and available testing offered - and preformed normal   -     Pt was counseled on exercise in pregnancy and weight gain recommendations.  -     Pt was counseled on travel recommendations and on risks of Covid 19 virus exposure.    -     Covid prevention reviewed with patient  -     Oriented to practice including call coverage   -     Follow-up routine OB  labs and u/s-- .   -       Pt is aware we call all results. If she does not hear from our office regarding her result within a week of having a study or procedure performed she is to call the office so that we can research the result for her as I do not know when she is scheduling her procedures.

## 2023-12-04 LAB
CHLAMYDIA: NEGATIVE
GONORRHEA: NEGATIVE
Lab: NORMAL
SOURCE: NORMAL

## 2023-12-08 ENCOUNTER — PATIENT MESSAGE (OUTPATIENT)
Dept: OTHER | Facility: OTHER | Age: 28
End: 2023-12-08
Payer: COMMERCIAL

## 2023-12-13 ENCOUNTER — PATIENT MESSAGE (OUTPATIENT)
Dept: OBSTETRICS AND GYNECOLOGY | Facility: CLINIC | Age: 28
End: 2023-12-13
Payer: COMMERCIAL

## 2023-12-15 ENCOUNTER — PATIENT MESSAGE (OUTPATIENT)
Dept: OTHER | Facility: OTHER | Age: 28
End: 2023-12-15
Payer: COMMERCIAL

## 2023-12-27 ENCOUNTER — ROUTINE PRENATAL (OUTPATIENT)
Dept: OBSTETRICS AND GYNECOLOGY | Facility: CLINIC | Age: 28
End: 2023-12-27
Payer: COMMERCIAL

## 2023-12-27 ENCOUNTER — PROCEDURE VISIT (OUTPATIENT)
Dept: OBSTETRICS AND GYNECOLOGY | Facility: CLINIC | Age: 28
End: 2023-12-27
Payer: COMMERCIAL

## 2023-12-27 VITALS
BODY MASS INDEX: 21.63 KG/M2 | HEART RATE: 49 BPM | DIASTOLIC BLOOD PRESSURE: 79 MMHG | SYSTOLIC BLOOD PRESSURE: 111 MMHG | WEIGHT: 126 LBS

## 2023-12-27 DIAGNOSIS — Z34.90 PREGNANCY, UNSPECIFIED GESTATIONAL AGE: Primary | ICD-10-CM

## 2023-12-27 DIAGNOSIS — Z3A.18 18 WEEKS GESTATION OF PREGNANCY: ICD-10-CM

## 2023-12-27 DIAGNOSIS — Z3A.18 18 WEEKS GESTATION OF PREGNANCY: Primary | ICD-10-CM

## 2023-12-27 PROCEDURE — 0502F SUBSEQUENT PRENATAL CARE: CPT | Mod: CPTII,S$GLB,, | Performed by: OBSTETRICS & GYNECOLOGY

## 2023-12-27 PROCEDURE — 0502F PR SUBSEQUENT PRENATAL CARE: ICD-10-PCS | Mod: CPTII,S$GLB,, | Performed by: OBSTETRICS & GYNECOLOGY

## 2023-12-27 PROCEDURE — 76805 OB US >/= 14 WKS SNGL FETUS: CPT | Mod: S$GLB,,, | Performed by: OBSTETRICS & GYNECOLOGY

## 2023-12-27 NOTE — PROGRESS NOTES
28 y.o. female  at 18w5d   Starting to feel flutters/FM, denies VB, leaking of fluid, cramping  Doing well   TW  anatomy US  Reviewed warning signs, normal FM,  labor precautions and how/when to call.  RTC x 4 wks, call or present sooner prn.   Covid vaccine recommended    Recheck of her pulse is 80 today

## 2024-01-05 ENCOUNTER — PATIENT MESSAGE (OUTPATIENT)
Dept: OTHER | Facility: OTHER | Age: 29
End: 2024-01-05
Payer: COMMERCIAL

## 2024-01-25 ENCOUNTER — ROUTINE PRENATAL (OUTPATIENT)
Dept: OBSTETRICS AND GYNECOLOGY | Facility: CLINIC | Age: 29
End: 2024-01-25
Payer: COMMERCIAL

## 2024-01-25 VITALS
HEART RATE: 94 BPM | DIASTOLIC BLOOD PRESSURE: 68 MMHG | SYSTOLIC BLOOD PRESSURE: 100 MMHG | BODY MASS INDEX: 22.49 KG/M2 | WEIGHT: 131 LBS

## 2024-01-25 DIAGNOSIS — Z34.90 PREGNANCY, UNSPECIFIED GESTATIONAL AGE: Primary | ICD-10-CM

## 2024-01-25 PROCEDURE — 0502F SUBSEQUENT PRENATAL CARE: CPT | Mod: CPTII,S$GLB,, | Performed by: OBSTETRICS & GYNECOLOGY

## 2024-01-25 NOTE — PROGRESS NOTES
29 y.o. female  at 22w6d   Reports + FM, denies VB, leaking of fluid, or cramping  Doing well   TW   Reviewed upcoming 28wk labs,and orders placed, tdap handout provided and explained  Reviewed warning signs, normal FM,  labor precautions and how/when to call.  RTC x 4 wks, call or present sooner prn.   Will check her cx and choroid pexis cyst next week   Going to [place]

## 2024-01-30 ENCOUNTER — PROCEDURE VISIT (OUTPATIENT)
Dept: OBSTETRICS AND GYNECOLOGY | Facility: CLINIC | Age: 29
End: 2024-01-30
Payer: COMMERCIAL

## 2024-01-30 DIAGNOSIS — Z34.90 PREGNANCY, UNSPECIFIED GESTATIONAL AGE: ICD-10-CM

## 2024-01-30 PROCEDURE — 76817 TRANSVAGINAL US OBSTETRIC: CPT | Mod: S$GLB,,, | Performed by: OBSTETRICS & GYNECOLOGY

## 2024-01-30 PROCEDURE — 76816 OB US FOLLOW-UP PER FETUS: CPT | Mod: S$GLB,,, | Performed by: OBSTETRICS & GYNECOLOGY

## 2024-01-31 ENCOUNTER — PATIENT MESSAGE (OUTPATIENT)
Dept: OBSTETRICS AND GYNECOLOGY | Facility: CLINIC | Age: 29
End: 2024-01-31
Payer: COMMERCIAL

## 2024-02-02 ENCOUNTER — PATIENT MESSAGE (OUTPATIENT)
Dept: OTHER | Facility: OTHER | Age: 29
End: 2024-02-02
Payer: COMMERCIAL

## 2024-02-15 ENCOUNTER — PATIENT MESSAGE (OUTPATIENT)
Dept: OBSTETRICS AND GYNECOLOGY | Facility: CLINIC | Age: 29
End: 2024-02-15
Payer: COMMERCIAL

## 2024-02-16 ENCOUNTER — PATIENT MESSAGE (OUTPATIENT)
Dept: OTHER | Facility: OTHER | Age: 29
End: 2024-02-16
Payer: COMMERCIAL

## 2024-02-22 ENCOUNTER — ROUTINE PRENATAL (OUTPATIENT)
Dept: OBSTETRICS AND GYNECOLOGY | Facility: CLINIC | Age: 29
End: 2024-02-22
Payer: COMMERCIAL

## 2024-02-22 VITALS
SYSTOLIC BLOOD PRESSURE: 108 MMHG | WEIGHT: 133 LBS | HEART RATE: 83 BPM | BODY MASS INDEX: 22.83 KG/M2 | DIASTOLIC BLOOD PRESSURE: 73 MMHG

## 2024-02-22 DIAGNOSIS — Z3A.26 26 WEEKS GESTATION OF PREGNANCY: Primary | ICD-10-CM

## 2024-02-22 PROCEDURE — 0502F SUBSEQUENT PRENATAL CARE: CPT | Mod: CPTII,S$GLB,, | Performed by: OBSTETRICS & GYNECOLOGY

## 2024-02-22 NOTE — PROGRESS NOTES
29 y.o. female  at 26w6d   Reports + FM, denies VB, leaking of fluid, or cramping  Doing well   TW  Reviewed upcoming 28wk labs, and orders placed, tdap handout provided and explained  Reviewed warning signs, normal FM,  labor precautions and how/when to call.  RTC x 4 wks, call or present sooner prn.

## 2024-02-24 LAB
ABS NRBC COUNT: 0 X 10 3/UL (ref 0–0.01)
ABSOLUTE BASOPHIL: 0.04 X 10 3/UL (ref 0–0.22)
ABSOLUTE EOSINOPHIL: 0.08 X 10 3/UL (ref 0.04–0.54)
ABSOLUTE IMMATURE GRAN: 0.12 X 10 3/UL (ref 0–0.04)
ABSOLUTE LYMPHOCYTE: 1.67 X 10 3/UL (ref 0.86–4.75)
ABSOLUTE MONOCYTE: 0.64 X 10 3/UL (ref 0.22–1.08)
BASOPHILS NFR BLD: 0.4 % (ref 0.2–1.2)
EOSINOPHIL NFR BLD: 0.8 % (ref 0.7–7)
GLUCOSE 1 HR POST 50 GM: 86 MG/DL
HCT VFR BLD AUTO: 38.3 % (ref 37–47)
HGB BLD-MCNC: 12.7 G/DL (ref 12–16)
IMMATURE GRANULOCYTES: 1.2 % (ref 0–0.5)
LYMPHOCYTES NFR BLD: 16.6 % (ref 19.3–53.1)
MCH RBC QN AUTO: 33.2 PG (ref 27–32)
MCHC RBC AUTO-ENTMCNC: 33.2 G/DL (ref 32–36)
MCV RBC AUTO: 100.3 FL (ref 82–100)
MONOCYTES NFR BLD: 6.4 % (ref 4.7–12.5)
NEUTROPHILS # BLD AUTO: 7.5 X 10 3/UL (ref 2.15–7.56)
NEUTROPHILS NFR BLD: 74.6 % (ref 34–71.1)
NUCLEATED RED BLOOD CELLS: 0 /100 WBC (ref 0–0.2)
PLATELET # BLD AUTO: 168 X 10 3/UL (ref 135–400)
RBC # BLD AUTO: 3.82 X 10 6/UL (ref 4.2–5.4)
RDW-SD: 50 FL (ref 37–54)
WBC # BLD: 10.05 X 10 3/UL (ref 4.3–10.8)

## 2024-03-01 ENCOUNTER — PATIENT MESSAGE (OUTPATIENT)
Dept: OTHER | Facility: OTHER | Age: 29
End: 2024-03-01
Payer: COMMERCIAL

## 2024-03-15 ENCOUNTER — PATIENT MESSAGE (OUTPATIENT)
Dept: OTHER | Facility: OTHER | Age: 29
End: 2024-03-15
Payer: COMMERCIAL

## 2024-03-21 ENCOUNTER — ROUTINE PRENATAL (OUTPATIENT)
Dept: OBSTETRICS AND GYNECOLOGY | Facility: CLINIC | Age: 29
End: 2024-03-21
Payer: COMMERCIAL

## 2024-03-21 VITALS
DIASTOLIC BLOOD PRESSURE: 74 MMHG | HEART RATE: 80 BPM | BODY MASS INDEX: 23.65 KG/M2 | SYSTOLIC BLOOD PRESSURE: 109 MMHG | WEIGHT: 137.81 LBS

## 2024-03-21 DIAGNOSIS — Z34.90 PREGNANCY, UNSPECIFIED GESTATIONAL AGE: ICD-10-CM

## 2024-03-21 DIAGNOSIS — Z3A.30 30 WEEKS GESTATION OF PREGNANCY: Primary | ICD-10-CM

## 2024-03-21 PROCEDURE — 0502F SUBSEQUENT PRENATAL CARE: CPT | Mod: CPTII,S$GLB,, | Performed by: OBSTETRICS & GYNECOLOGY

## 2024-03-21 NOTE — PROGRESS NOTES
29 y.o. female  at 30w6d   Reports normal fetal mvts, denies vaginal bleeding, Leaking fluid  or regular contractions  Doing well  TWwk labs discussed   perfect  no anemia   Tdap  Reviewed warning signs, normal fetal movements,  labor precautions discussed  RTC x 2 wks, call or present sooner prn.    She notes good fetal mvts

## 2024-03-29 ENCOUNTER — PATIENT MESSAGE (OUTPATIENT)
Dept: OTHER | Facility: OTHER | Age: 29
End: 2024-03-29
Payer: COMMERCIAL

## 2024-04-09 ENCOUNTER — CLINICAL SUPPORT (OUTPATIENT)
Dept: OBSTETRICS AND GYNECOLOGY | Facility: CLINIC | Age: 29
End: 2024-04-09
Payer: COMMERCIAL

## 2024-04-09 ENCOUNTER — ROUTINE PRENATAL (OUTPATIENT)
Dept: OBSTETRICS AND GYNECOLOGY | Facility: CLINIC | Age: 29
End: 2024-04-09
Payer: COMMERCIAL

## 2024-04-09 VITALS
DIASTOLIC BLOOD PRESSURE: 71 MMHG | SYSTOLIC BLOOD PRESSURE: 103 MMHG | WEIGHT: 140 LBS | BODY MASS INDEX: 24.03 KG/M2 | HEART RATE: 65 BPM

## 2024-04-09 DIAGNOSIS — Z3A.33 33 WEEKS GESTATION OF PREGNANCY: Primary | ICD-10-CM

## 2024-04-09 DIAGNOSIS — Z34.90 PREGNANCY, UNSPECIFIED GESTATIONAL AGE: ICD-10-CM

## 2024-04-09 PROCEDURE — 90715 TDAP VACCINE 7 YRS/> IM: CPT | Mod: S$GLB,,, | Performed by: OBSTETRICS & GYNECOLOGY

## 2024-04-09 PROCEDURE — 0502F SUBSEQUENT PRENATAL CARE: CPT | Mod: CPTII,S$GLB,, | Performed by: OBSTETRICS & GYNECOLOGY

## 2024-04-09 PROCEDURE — 90471 IMMUNIZATION ADMIN: CPT | Mod: S$GLB,,, | Performed by: OBSTETRICS & GYNECOLOGY

## 2024-04-09 NOTE — PROGRESS NOTES
29 y.o. female  at 33w4d   Reports normal fetal mvts, denies vaginal bleeding, Leaking fluid  or regular contractions  Doing well  TW  Tdap covid vaccines discussed     Reviewed warning signs, normal fetal movements,  labor precautions in detail   RTC x 2 wks, call or present sooner prn.

## 2024-04-19 ENCOUNTER — PATIENT MESSAGE (OUTPATIENT)
Dept: OTHER | Facility: OTHER | Age: 29
End: 2024-04-19
Payer: COMMERCIAL

## 2024-04-24 ENCOUNTER — ROUTINE PRENATAL (OUTPATIENT)
Dept: OBSTETRICS AND GYNECOLOGY | Facility: CLINIC | Age: 29
End: 2024-04-24
Payer: COMMERCIAL

## 2024-04-24 VITALS
HEART RATE: 106 BPM | DIASTOLIC BLOOD PRESSURE: 74 MMHG | SYSTOLIC BLOOD PRESSURE: 111 MMHG | BODY MASS INDEX: 24.17 KG/M2 | WEIGHT: 140.81 LBS

## 2024-04-24 DIAGNOSIS — Z3A.35 35 WEEKS GESTATION OF PREGNANCY: Primary | ICD-10-CM

## 2024-04-24 DIAGNOSIS — Z34.90 PREGNANCY, UNSPECIFIED GESTATIONAL AGE: ICD-10-CM

## 2024-04-24 PROCEDURE — 0502F SUBSEQUENT PRENATAL CARE: CPT | Mod: CPTII,S$GLB,, | Performed by: OBSTETRICS & GYNECOLOGY

## 2024-04-24 NOTE — PROGRESS NOTES
Patient is experiencing irregular contractions   Denies vaginal bleeding or fluid loss   Reports good fetal movememt

## 2024-04-24 NOTE — PROGRESS NOTES
29 y.o. female  at 35w5d  Reports + FM, denies VB, Leaking or regular CTX  Doing well  TW  GBS collected    Reviewed warning signs, normal fetal movements, labor precautions discussed in detail   RTC 1 wks, or sooner prn. Labor and delivery PRN   Chaperone for pelvic exam present     Labor precautions   Size less than dates

## 2024-04-25 NOTE — PROGRESS NOTES
29 y.o. female  at 36w0d  Reports + FM, denies VB, Leaking or regular CTX  Doing well  TW  GBS collected    Reviewed warning signs, normal fetal movements, labor precautions discussed in detail   RTC 1 wks, or sooner prn. Labor and delivery PRN   Chaperone for pelvic exam present   doing good

## 2024-04-26 ENCOUNTER — PATIENT MESSAGE (OUTPATIENT)
Dept: OBSTETRICS AND GYNECOLOGY | Facility: CLINIC | Age: 29
End: 2024-04-26
Payer: COMMERCIAL

## 2024-04-26 LAB
GROUP B STREP MOLECULAR: NEGATIVE
PENICILLIN ALLERGIC: NO

## 2024-05-02 ENCOUNTER — ROUTINE PRENATAL (OUTPATIENT)
Dept: OBSTETRICS AND GYNECOLOGY | Facility: CLINIC | Age: 29
End: 2024-05-02
Payer: COMMERCIAL

## 2024-05-02 ENCOUNTER — PROCEDURE VISIT (OUTPATIENT)
Dept: OBSTETRICS AND GYNECOLOGY | Facility: CLINIC | Age: 29
End: 2024-05-02
Payer: COMMERCIAL

## 2024-05-02 VITALS
HEART RATE: 76 BPM | DIASTOLIC BLOOD PRESSURE: 72 MMHG | WEIGHT: 142 LBS | SYSTOLIC BLOOD PRESSURE: 109 MMHG | BODY MASS INDEX: 24.37 KG/M2

## 2024-05-02 DIAGNOSIS — Z34.90 PREGNANCY, UNSPECIFIED GESTATIONAL AGE: Primary | ICD-10-CM

## 2024-05-02 PROCEDURE — 76816 OB US FOLLOW-UP PER FETUS: CPT | Mod: S$GLB,,, | Performed by: OBSTETRICS & GYNECOLOGY

## 2024-05-02 PROCEDURE — 0502F SUBSEQUENT PRENATAL CARE: CPT | Mod: CPTII,S$GLB,, | Performed by: OBSTETRICS & GYNECOLOGY

## 2024-05-02 NOTE — PROGRESS NOTES
29 y.o. female  at 37w0d  Reports + FM, denies VB, Leaking or regular CTX  Doing well without   TWG:   GBS collected    Reviewed warning signs, normal fetal movements, labor precautions discussed in detail   RTC 1 wks, or sooner prn. Labor and delivery PRN   Chaperone was present for pelvic exam     Re discussed the induction

## 2024-05-03 NOTE — PROGRESS NOTES
Please set up a follow up ultrasound for the next visit   I reviewed the ultrasound and noted the femur length.  Her previous ultrasounds were also reviewed.   Will get another ultrasound next visit as a follow up but I will discuss with her at the next visit if still noted

## 2024-05-06 DIAGNOSIS — Z3A.38 38 WEEKS GESTATION OF PREGNANCY: Primary | ICD-10-CM

## 2024-05-06 DIAGNOSIS — O35.HXX0 SHORT FEMUR OF FETUS ON PRENATAL ULTRASOUND: ICD-10-CM

## 2024-05-08 ENCOUNTER — ROUTINE PRENATAL (OUTPATIENT)
Dept: OBSTETRICS AND GYNECOLOGY | Facility: CLINIC | Age: 29
End: 2024-05-08
Payer: COMMERCIAL

## 2024-05-08 ENCOUNTER — PROCEDURE VISIT (OUTPATIENT)
Dept: OBSTETRICS AND GYNECOLOGY | Facility: CLINIC | Age: 29
End: 2024-05-08
Payer: COMMERCIAL

## 2024-05-08 VITALS
SYSTOLIC BLOOD PRESSURE: 112 MMHG | DIASTOLIC BLOOD PRESSURE: 75 MMHG | BODY MASS INDEX: 24.2 KG/M2 | HEART RATE: 56 BPM | WEIGHT: 141 LBS

## 2024-05-08 DIAGNOSIS — O35.HXX0 SHORT FEMUR OF FETUS ON PRENATAL ULTRASOUND: ICD-10-CM

## 2024-05-08 DIAGNOSIS — Z3A.38 38 WEEKS GESTATION OF PREGNANCY: ICD-10-CM

## 2024-05-08 DIAGNOSIS — Z3A.35 35 WEEKS GESTATION OF PREGNANCY: Primary | ICD-10-CM

## 2024-05-08 PROCEDURE — 76815 OB US LIMITED FETUS(S): CPT | Mod: S$GLB,,, | Performed by: OBSTETRICS & GYNECOLOGY

## 2024-05-08 PROCEDURE — 0502F SUBSEQUENT PRENATAL CARE: CPT | Mod: CPTII,S$GLB,, | Performed by: OBSTETRICS & GYNECOLOGY

## 2024-05-08 NOTE — PROGRESS NOTES
29 y.o. female  at 37w6d  Reports + FM, denies VB, Leaking or regular CTX  Doing well without   TWG:   GBS collected    Reviewed warning signs, normal fetal movements, labor precautions discussed in detail   RTC 1 wks, or sooner prn. Labor and delivery PRN   Chaperone was present for pelvic exam

## 2024-05-08 NOTE — PROGRESS NOTES
29 y.o. female  at 37w6d  Reports + FM, denies VB, Leaking or regular CTX  Doing well without   TW  GBS collected    Reviewed warning signs, normal fetal movements, labor precautions discussed in detail   RTC 1 wks, or sooner prn. Labor and delivery PRN   Chaperone was present for pelvic exam     Her cx is 1 and 80 %    I discussed the short femur noted last week and this  her  I small framed.  But will need to evaluate after delivery however I suspect it is likely genetics and not a concerns but at delivery will be able to assess.     She is having some contractions  precautions are discussed

## 2024-05-15 ENCOUNTER — ROUTINE PRENATAL (OUTPATIENT)
Dept: OBSTETRICS AND GYNECOLOGY | Facility: CLINIC | Age: 29
End: 2024-05-15
Payer: COMMERCIAL

## 2024-05-15 VITALS
DIASTOLIC BLOOD PRESSURE: 77 MMHG | HEART RATE: 97 BPM | BODY MASS INDEX: 24.37 KG/M2 | WEIGHT: 142 LBS | SYSTOLIC BLOOD PRESSURE: 110 MMHG

## 2024-05-15 DIAGNOSIS — Z34.90 PREGNANCY, UNSPECIFIED GESTATIONAL AGE: Primary | ICD-10-CM

## 2024-05-15 PROCEDURE — 0502F SUBSEQUENT PRENATAL CARE: CPT | Mod: CPTII,S$GLB,,

## 2024-05-15 NOTE — PROGRESS NOTES
29 y.o. female  at 38w6d  Reports + FM, denies VB, Leaking or regular CTX  Reports irregular contractions  TW  Reviewed warning signs, normal fetal movements, labor precautions discussed in detail   Chaperone was present for pelvic exam     Pt sent to L&D for Cytotec induction tonight

## 2024-05-16 ENCOUNTER — OUTSIDE PLACE OF SERVICE (OUTPATIENT)
Dept: OBSTETRICS AND GYNECOLOGY | Facility: CLINIC | Age: 29
End: 2024-05-16
Payer: COMMERCIAL

## 2024-05-16 PROCEDURE — 59400 OBSTETRICAL CARE: CPT | Mod: GB,,, | Performed by: OBSTETRICS & GYNECOLOGY

## 2024-05-17 ENCOUNTER — TELEPHONE (OUTPATIENT)
Dept: OBSTETRICS AND GYNECOLOGY | Facility: CLINIC | Age: 29
End: 2024-05-17
Payer: COMMERCIAL

## 2024-05-17 NOTE — TELEPHONE ENCOUNTER
Phone call returned   Patient is being discharged from hospital at this time.  She has [place] paperwork to be filled out.  Patient is offered to come by the office after discharged if before 5:00   If not send to me attached to a message.  Patient voices understanding

## 2024-05-17 NOTE — TELEPHONE ENCOUNTER
----- Message from Nicole Gates sent at 5/17/2024  3:47 PM CDT -----  Contact: chris  to pt  Type: Staff Message    Caller: chris  to pt  Call Back Number: [phone]  Nature of the Call: pt needing paper work signed by Dr Feng for maternity leave,FMLA and Disability  Additional Information: na

## 2024-05-23 ENCOUNTER — PATIENT MESSAGE (OUTPATIENT)
Dept: OBSTETRICS AND GYNECOLOGY | Facility: CLINIC | Age: 29
End: 2024-05-23
Payer: COMMERCIAL

## 2024-05-28 ENCOUNTER — CLINICAL SUPPORT (OUTPATIENT)
Dept: OBSTETRICS AND GYNECOLOGY | Facility: CLINIC | Age: 29
End: 2024-05-28
Payer: COMMERCIAL

## 2024-05-28 VITALS — HEART RATE: 98 BPM | SYSTOLIC BLOOD PRESSURE: 109 MMHG | DIASTOLIC BLOOD PRESSURE: 75 MMHG

## 2024-05-28 DIAGNOSIS — G89.18 EPISIOTOMY PAIN: Primary | ICD-10-CM

## 2024-05-28 NOTE — PROGRESS NOTES
Patient is in for a episiotomy check.  Sutures are in place.  I do note a superficial tear on right labia.  She is reassured this will heal.  She may try sitz baths and ice pacs. Use a Tawny bottle after voiding and bowel movements.  She is taking Ibuprofen 600mg with good pain control. Patient is advised to reach out if her symptoms do not improve or worsen.

## 2024-06-21 ENCOUNTER — POSTPARTUM VISIT (OUTPATIENT)
Dept: OBSTETRICS AND GYNECOLOGY | Facility: CLINIC | Age: 29
End: 2024-06-21
Payer: COMMERCIAL

## 2024-06-21 ENCOUNTER — PATIENT MESSAGE (OUTPATIENT)
Dept: OBSTETRICS AND GYNECOLOGY | Facility: CLINIC | Age: 29
End: 2024-06-21

## 2024-06-21 VITALS
BODY MASS INDEX: 20.66 KG/M2 | WEIGHT: 121 LBS | SYSTOLIC BLOOD PRESSURE: 102 MMHG | HEART RATE: 93 BPM | DIASTOLIC BLOOD PRESSURE: 73 MMHG | HEIGHT: 64 IN

## 2024-06-21 DIAGNOSIS — Z30.9 ENCOUNTER FOR CONTRACEPTIVE MANAGEMENT, UNSPECIFIED TYPE: ICD-10-CM

## 2024-06-21 RX ORDER — LEVONORGESTREL/ETHIN.ESTRADIOL 0.1-0.02MG
1 TABLET ORAL DAILY
Qty: 84 TABLET | Refills: 3 | Status: SHIPPED | OUTPATIENT
Start: 2024-06-21 | End: 2024-09-19

## 2024-06-21 NOTE — PROGRESS NOTES
Subjective:       Patient ID: Ana Padilla is a 29 y.o. female.    Chief Complaint:  Postpartum Care      History of Present Illness  She is doing well.  No new health issues,  No abnormal bleeding, No GI or Gu concerns,  No postpartum blues or depression.   Birth control discussed.  . Bottle feeding.  She would  like OCPs  HPI      GYN & OB History  No LMP recorded.     Date of Last Pap: Last Colonoscopy completed on     OB History    Para Term  AB Living   1 0 0 0 0 0   SAB IAB Ectopic Multiple Live Births   0 0 0 0 0      # Outcome Date GA Lbr Gildardo/2nd Weight Sex Type Anes PTL Lv   1                 Review of Systems  Review of Systems   Constitutional:  Negative for activity change.   Eyes:  Negative for visual disturbance.   Respiratory:  Negative for shortness of breath.    Cardiovascular:  Negative for chest pain.   Gastrointestinal:  Negative for abdominal pain.   Genitourinary:  Negative for vaginal bleeding.        No abnormal vaginal bleeding   Musculoskeletal:  Negative for back pain.   Integumentary:  Negative for rash and breast mass.   Neurological:  Negative for numbness.   Psychiatric/Behavioral:          No mood disturbance or changes    Breast: Negative for mass          Objective:    Physical Exam:   Constitutional: She appears well-developed and well-nourished.             Abdominal: Soft.     Genitourinary:    Vagina and uterus normal.   No tenderness or bleeding in the vagina.                 Psychiatric: She has a normal mood and affect. Her behavior is normal.        Assessment:      No diagnosis found.             Plan:             Her last pap smear is discussed she is to return for her annual and pap.    Birth control discussed   All questions answered  Chaperone was present

## 2024-06-25 ENCOUNTER — PATIENT MESSAGE (OUTPATIENT)
Dept: OBSTETRICS AND GYNECOLOGY | Facility: CLINIC | Age: 29
End: 2024-06-25
Payer: COMMERCIAL

## 2024-07-08 ENCOUNTER — PATIENT MESSAGE (OUTPATIENT)
Dept: OBSTETRICS AND GYNECOLOGY | Facility: CLINIC | Age: 29
End: 2024-07-08
Payer: COMMERCIAL

## 2024-07-10 ENCOUNTER — PATIENT MESSAGE (OUTPATIENT)
Dept: OBSTETRICS AND GYNECOLOGY | Facility: CLINIC | Age: 29
End: 2024-07-10
Payer: COMMERCIAL

## 2024-07-11 ENCOUNTER — OFFICE VISIT (OUTPATIENT)
Dept: OBSTETRICS AND GYNECOLOGY | Facility: CLINIC | Age: 29
End: 2024-07-11
Payer: COMMERCIAL

## 2024-07-11 VITALS
WEIGHT: 123.63 LBS | HEART RATE: 76 BPM | HEIGHT: 64 IN | BODY MASS INDEX: 21.11 KG/M2 | SYSTOLIC BLOOD PRESSURE: 111 MMHG | DIASTOLIC BLOOD PRESSURE: 73 MMHG

## 2024-07-11 DIAGNOSIS — S31.41XA VAGINAL LACERATION, INITIAL ENCOUNTER: Primary | ICD-10-CM

## 2024-07-11 RX ORDER — ESTRADIOL 0.1 MG/G
1 CREAM VAGINAL DAILY
Qty: 42.5 G | Refills: 1 | Status: SHIPPED | OUTPATIENT
Start: 2024-07-11 | End: 2025-07-11

## 2024-07-11 NOTE — PROGRESS NOTES
Subjective:       Patient ID: Ana Padilla is a 29 y.o. female.    Chief Complaint:  Postpartum Care (Pain near excisonal area with pressure pain level is at a 4)      History of Present Illness  She is doing well.  No new health issues,  No abnormal bleeding, No GI or Gu concerns,  No postpartum blues or depression.   Birth control discussed.  Area not healing on the vaginal skin  .   HPI      GYN & OB History  No LMP recorded.     Date of Last Pap: 2023    OB History    Para Term  AB Living   1 0 0 0 0 0   SAB IAB Ectopic Multiple Live Births   0 0 0 0 0      # Outcome Date GA Lbr Gildardo/2nd Weight Sex Type Anes PTL Lv   1                 Review of Systems  Review of Systems          Objective:    Physical Exam:               Genitourinary:          Genitourinary Comments: Once cm open granulation tissue   No suture noted                        Assessment:      No diagnosis found.             Plan:             Her last pap smear is discussed she is to return for her annual and pap.    Birth control discussed   All questions answered  Chaperone was present

## 2024-07-31 ENCOUNTER — POSTPARTUM VISIT (OUTPATIENT)
Dept: OBSTETRICS AND GYNECOLOGY | Facility: CLINIC | Age: 29
End: 2024-07-31
Payer: COMMERCIAL

## 2024-07-31 VITALS — DIASTOLIC BLOOD PRESSURE: 86 MMHG | HEART RATE: 89 BPM | SYSTOLIC BLOOD PRESSURE: 118 MMHG

## 2024-07-31 PROCEDURE — 0503F POSTPARTUM CARE VISIT: CPT | Mod: CPTII,S$GLB,, | Performed by: OBSTETRICS & GYNECOLOGY

## 2024-07-31 NOTE — PROGRESS NOTES
Subjective:       Patient ID: Ana Padilla is a 29 y.o. female.    Chief Complaint:  Postpartum Care (11 weeks post partum. Patient denies any problems or concerns today. )      History of Present Illness  She is here to evaluate  her vaginal healing.  She has no concerns today       GYN & OB History  Patient's last menstrual period was 2024.     Date of Last Pap: 2023    OB History    Para Term  AB Living   1 0 0 0 0 0   SAB IAB Ectopic Multiple Live Births   0 0 0 0 0      # Outcome Date GA Lbr Gildardo/2nd Weight Sex Type Anes PTL Lv   1                 Review of Systems  Review of Systems          Objective:    Physical Exam  she is healing very nicely and no concerns today        Assessment:        1. 6 weeks postpartum follow-up                 Plan:           Follow up  prn or for her annual   Pt is aware we call all results. If she does not hear from our office regarding her result within a week of having a study or procedure performed she is to call the office so that we can research the result for her.  Discussed follow up.  She is to rtn if her symptoms do not resolve or if persist  Chaperone was present

## 2024-10-04 ENCOUNTER — TELEPHONE (OUTPATIENT)
Dept: OBSTETRICS AND GYNECOLOGY | Facility: CLINIC | Age: 29
End: 2024-10-04
Payer: COMMERCIAL

## 2024-10-04 NOTE — TELEPHONE ENCOUNTER
Her  was killed in an automobile accident a few days ago.   She is tearful but doing ok   I offered meds but she states she has something for now

## 2025-02-06 ENCOUNTER — PATIENT MESSAGE (OUTPATIENT)
Dept: OBSTETRICS AND GYNECOLOGY | Facility: CLINIC | Age: 30
End: 2025-02-06
Payer: COMMERCIAL

## 2025-02-06 DIAGNOSIS — F41.9 ANXIETY: Primary | ICD-10-CM

## 2025-02-06 RX ORDER — BUSPIRONE HYDROCHLORIDE 15 MG/1
TABLET ORAL
Qty: 60 TABLET | Refills: 11 | Status: SHIPPED | OUTPATIENT
Start: 2025-02-06

## 2025-02-06 RX ORDER — CLORAZEPATE DIPOTASSIUM 7.5 MG/1
7.5 TABLET ORAL 2 TIMES DAILY PRN
Qty: 30 TABLET | Refills: 0 | Status: SHIPPED | OUTPATIENT
Start: 2025-02-06 | End: 2025-03-08

## 2025-06-19 ENCOUNTER — OFFICE VISIT (OUTPATIENT)
Dept: OBSTETRICS AND GYNECOLOGY | Facility: CLINIC | Age: 30
End: 2025-06-19
Payer: COMMERCIAL

## 2025-06-19 VITALS
DIASTOLIC BLOOD PRESSURE: 78 MMHG | BODY MASS INDEX: 17.71 KG/M2 | SYSTOLIC BLOOD PRESSURE: 112 MMHG | WEIGHT: 103.19 LBS | HEART RATE: 99 BPM

## 2025-06-19 DIAGNOSIS — Z01.419 WELL WOMAN EXAM WITH ROUTINE GYNECOLOGICAL EXAM: Primary | ICD-10-CM

## 2025-06-19 DIAGNOSIS — F41.9 ANXIETY: ICD-10-CM

## 2025-06-19 DIAGNOSIS — Z80.3 FAMILY HISTORY OF BREAST CANCER: ICD-10-CM

## 2025-06-19 DIAGNOSIS — D17.79 LIPOMA OF OTHER SPECIFIED SITES: ICD-10-CM

## 2025-06-19 PROCEDURE — 3078F DIAST BP <80 MM HG: CPT | Mod: CPTII,,, | Performed by: OBSTETRICS & GYNECOLOGY

## 2025-06-19 PROCEDURE — 3008F BODY MASS INDEX DOCD: CPT | Mod: CPTII,,, | Performed by: OBSTETRICS & GYNECOLOGY

## 2025-06-19 PROCEDURE — 3074F SYST BP LT 130 MM HG: CPT | Mod: CPTII,,, | Performed by: OBSTETRICS & GYNECOLOGY

## 2025-06-19 PROCEDURE — 99395 PREV VISIT EST AGE 18-39: CPT | Mod: S$PBB,,, | Performed by: OBSTETRICS & GYNECOLOGY

## 2025-06-19 PROCEDURE — 1159F MED LIST DOCD IN RCRD: CPT | Mod: CPTII,,, | Performed by: OBSTETRICS & GYNECOLOGY

## 2025-06-19 RX ORDER — CLORAZEPATE DIPOTASSIUM 7.5 MG/1
7.5 TABLET ORAL 2 TIMES DAILY PRN
Qty: 30 TABLET | Refills: 0 | Status: SHIPPED | OUTPATIENT
Start: 2025-06-19 | End: 2025-07-19

## 2025-06-19 NOTE — PROGRESS NOTES
Subjective:       Patient ID: Ana Padilla is a 30 y.o. female.    Chief Complaint:  Well Woman      History of Present Illness  She is doing well.  No new health issues,  No abnormal bleeding, No GI or Gu concerns,  No dyspareunia  She took the buspar in the past it was BID she is taking irregular  now.  She takes the tranxene.on occasion. She has a lot help. Her cycles are OK  no pain or problems.  Not on birth control.     She is now gaining weight.   Maternal history of breast ca           HPI    Vitals:    25 1005   BP: 112/78   Pulse: 99   Weight: 46.8 kg (103 lb 3.2 oz)       GYN & OB History  Patient's last menstrual period was 2025.     Date of Last Pap: 2023    OB History    Para Term  AB Living   1 0 0 0 0 0   SAB IAB Ectopic Multiple Live Births   0 0 0 0 0      # Outcome Date GA Lbr Gildardo/2nd Weight Sex Type Anes PTL Lv   1                 No past medical history on file.     Past Surgical History:   Procedure Laterality Date    EYE SURGERY  PRK    WISDOM TOOTH EXTRACTION          Review of Systems  Review of Systems   Constitutional:  Negative for activity change.   Eyes:  Negative for visual disturbance.   Respiratory:  Negative for shortness of breath.    Cardiovascular:  Negative for chest pain.   Gastrointestinal:  Negative for abdominal pain.   Genitourinary:  Negative for vaginal bleeding.        No abnormal vaginal bleeding   Musculoskeletal:  Negative for back pain.   Integumentary:  Negative for rash and breast mass.   Neurological:  Negative for numbness.   Psychiatric/Behavioral:          No mood disturbance or changes    Breast: Negative for mass            Objective:    Physical Exam:   Constitutional: She is oriented to person, place, and time. She appears well-developed. She is cooperative.    HENT:   Head: Normocephalic.     Neck: Trachea normal. No thyromegaly present.    Cardiovascular:  Normal rate, regular rhythm and normal heart sounds.              Pulmonary/Chest: Effort normal and breath sounds normal. Right breast exhibits no mass, no nipple discharge and no skin change. Left breast exhibits no mass, no nipple discharge and no skin change.        Abdominal: Soft. There is no abdominal tenderness. There is no rebound and no guarding.     Genitourinary:    Vagina and uterus normal.            Pelvic exam was performed with patient supine.     Labial bartholins normal.There is no lesion on the right labia. There is no lesion on the left labia. Cervix is normal. Right adnexum displays no mass and no tenderness. Left adnexum displays no mass and no tenderness. Cervix exhibits no discharge. Uterus is not enlarged and not tender.    Genitourinary Comments: Mobile prob  lipoma as she states it has been there for years.                 Lymphadenopathy:        Head (right side): No submental and no submandibular adenopathy present.        Head (left side): No submental and no submandibular adenopathy present.     She has no cervical adenopathy.    Neurological: She is alert and oriented to person, place, and time.    Skin: Skin is warm.    Psychiatric: She has a normal mood and affect. Her speech is normal and behavior is normal. Thought content normal.          Assessment:        1. Well woman exam with routine gynecological exam    2. Lipoma of other specified sites    3. Anxiety    4. Family history of breast cancer                 Plan:           GT discussed     Dr Aranda for excision of prob lipoma.   Mammogram and possible MRI based on GT   will get this first   Pap discussed will return for her annual     Pt is aware we call all results. If she does not hear from our office regarding her result within a week of having a study or procedure performed she is to call the office so that we can research the result for her.  Birth control discussed  Chaperone was present

## 2025-07-16 ENCOUNTER — OFFICE VISIT (OUTPATIENT)
Dept: SURGERY | Facility: CLINIC | Age: 30
End: 2025-07-16
Payer: COMMERCIAL

## 2025-07-16 DIAGNOSIS — D17.79 LIPOMA OF OTHER SPECIFIED SITES: ICD-10-CM

## 2025-07-16 DIAGNOSIS — R22.9 SUBCUTANEOUS MASS: Primary | ICD-10-CM

## 2025-07-16 PROCEDURE — 1160F RVW MEDS BY RX/DR IN RCRD: CPT | Mod: CPTII,,, | Performed by: SURGERY

## 2025-07-16 PROCEDURE — 99203 OFFICE O/P NEW LOW 30 MIN: CPT | Mod: S$PBB,,, | Performed by: SURGERY

## 2025-07-16 PROCEDURE — 1159F MED LIST DOCD IN RCRD: CPT | Mod: CPTII,,, | Performed by: SURGERY

## 2025-07-16 NOTE — PROGRESS NOTES
History & Physical    SUBJECTIVE:     History of Present Illness:    Very pleasant 30-year-old female referred by Dr. Pasha Feng for a subcutaneous lipomatous mass in the left groin region.  Patient states it has probably been present for 10 years, maybe more.  Slowly increasing in size and she desires removal.    Chief Complaint   Patient presents with    Mass     Lipoma LT inner thigh crease/ groin area         Review of patient's allergies indicates:  Review of patient's allergies indicates:  No Known Allergies    Medications Ordered Prior to Encounter[1]    History reviewed. No pertinent past medical history.  Past Surgical History:   Procedure Laterality Date    EYE SURGERY  PRK    WISDOM TOOTH EXTRACTION       Family History   Problem Relation Name Age of Onset    Diabetes Maternal Grandmother Ayaka     Breast cancer Maternal Grandmother Ayaka     Cancer Maternal Grandmother Ayaka         Breast Cancer    Cancer Mother Jodie         Neuroendocrine    Colon cancer Neg Hx      Eclampsia Neg Hx      Hypertension Neg Hx      Miscarriages / Stillbirths Neg Hx      Ovarian cancer Neg Hx       labor Neg Hx      Stroke Neg Hx      Uterine cancer Neg Hx      Cervical cancer Neg Hx         Social History[2]       Review of Systems   Constitutional: Negative.    Respiratory: Negative.     Cardiovascular: Negative.    Gastrointestinal: Negative.    Genitourinary: Negative.    Musculoskeletal: Negative.    Neurological: Negative.    Psychiatric/Behavioral: Negative.         OBJECTIVE:     There were no vitals filed for this visit.              Physical Exam:  Physical Exam  Constitutional:       Appearance: Normal appearance. She is normal weight.   HENT:      Head: Normocephalic and atraumatic.   Eyes:      Extraocular Movements: Extraocular movements intact.      Pupils: Pupils are equal, round, and reactive to light.   Cardiovascular:      Rate and Rhythm: Normal rate and regular rhythm.   Pulmonary:       Effort: Pulmonary effort is normal.      Breath sounds: Normal breath sounds.   Abdominal:      General: Abdomen is flat. Bowel sounds are normal.      Palpations: Abdomen is soft.          Comments: Just below the inguinal crease there is a subcutaneous mass that is soft, mobile, 3 by 2 cm in size.  Probable subcutaneous lipoma   Musculoskeletal:         General: Normal range of motion.      Cervical back: Normal range of motion.   Skin:     General: Skin is warm and dry.   Neurological:      General: No focal deficit present.      Mental Status: She is alert and oriented to person, place, and time.      Cranial Nerves: No cranial nerve deficit.   Psychiatric:         Mood and Affect: Mood normal.         Behavior: Behavior normal.             ASSESSMENT/PLAN:   Subcutaneous lipoma, 3 cm, left inguinal /groin region  PLAN:  Discussed excision with patient as an outpatient day surgery there under local MAC or a light general anesthetic.  Patient agreeable and has scheduled her surgery for August 12, 2025.  Procedure, risk and benefits discussed and all questions answered as well as expected postop recovery and limitations               [1]   Current Outpatient Medications on File Prior to Visit   Medication Sig Dispense Refill    busPIRone (BUSPAR) 15 MG tablet One Po BID Prn  anxiety 60 tablet 11    clorazepate (TRANXENE) 7.5 MG Tab Take 1 tablet (7.5 mg total) by mouth 2 (two) times daily as needed (anxiety). 30 tablet 0    levonorgestrel-ethinyl estradiol 0.1-20 mg-mcg per tablet Take 1 tablet by mouth once daily. 84 tablet 3     No current facility-administered medications on file prior to visit.   [2]   Social History  Socioeconomic History    Marital status:    Tobacco Use    Smoking status: Never   Substance and Sexual Activity    Alcohol use: Not Currently     Comment: occasional    Drug use: Never    Sexual activity: Yes     Partners: Male

## 2025-08-12 ENCOUNTER — PATIENT MESSAGE (OUTPATIENT)
Dept: OBSTETRICS AND GYNECOLOGY | Facility: CLINIC | Age: 30
End: 2025-08-12
Payer: COMMERCIAL

## 2025-08-27 ENCOUNTER — PATIENT MESSAGE (OUTPATIENT)
Dept: OBSTETRICS AND GYNECOLOGY | Facility: CLINIC | Age: 30
End: 2025-08-27
Payer: COMMERCIAL